# Patient Record
Sex: MALE | Race: WHITE | Employment: OTHER | URBAN - METROPOLITAN AREA
[De-identification: names, ages, dates, MRNs, and addresses within clinical notes are randomized per-mention and may not be internally consistent; named-entity substitution may affect disease eponyms.]

---

## 2018-09-24 ENCOUNTER — OFFICE VISIT (OUTPATIENT)
Dept: ORTHOPEDIC SURGERY | Age: 63
End: 2018-09-24
Payer: COMMERCIAL

## 2018-09-24 VITALS — WEIGHT: 175 LBS | HEIGHT: 69 IN | TEMPERATURE: 98 F | BODY MASS INDEX: 25.92 KG/M2

## 2018-09-24 DIAGNOSIS — M48.061 SPINAL STENOSIS OF LUMBAR REGION WITHOUT NEUROGENIC CLAUDICATION: Primary | ICD-10-CM

## 2018-09-24 PROCEDURE — 99204 OFFICE O/P NEW MOD 45 MIN: CPT | Performed by: ORTHOPAEDIC SURGERY

## 2018-09-24 NOTE — PROGRESS NOTES
(-)swelling, (-) joint pain,swelling. Neurologic: (-) epilepsy, (-)seizures,(-) brain tumor,(-) TIA, (-)stroke, (-)headaches, (-)Parkinson disease,(-) memory loss, (-) LOC. Cardiovascular: (-) Chest pain, (-) swelling in legs/feet, (-) SOB, (-) cramping in legs/feet with walking. Respiratory: (-) SOB, (-) Coughing, (-) night sweats. GI: (-) nausea, (-) vomiting, (-) diarrhea, (-) blood in stool, (-) gastric ulcer. Psychiatric: (-) Depression, (-) Anxiety, (-) bipolar disease, (-) Alzheimer's Disease  Allergic/Immunologic: (-) allergies latex, (-) allergies metal, (-) skin sensitivity. Hematlogic: (-) anemia, (-) blood transfusion, (-) DVT/PE, (-) Clotting disorders      Subjective:      Constitution:    Temp 98 °F (36.7 °C)   Ht 5' 9\" (1.753 m)   Wt 175 lb (79.4 kg)   BMI 25.84 kg/m²       Psycihatric:    The patient is alert and oriented x 3, appears to be stated age and in no distress. Respiratory:    Respiratory effort is not labored. Patient is not gasping. Palpation of the chest reveals no tactile fremitus. Skin:    Upon inspection: the skin appears warm, dry and intact. There is not a previous scar over the affected area. There is not any cellulitis, lymphedema or cutaneous lesions noted in the lower extremities. Upon palpation there is no induration noted. Neurologic:      Motor exam of the lower extremities show ; quadriceps, hamstrings, foot dorsi and plantar flexors intact R.  5/5 and L. 5/5. Deep tendon reflexes are 2/4 at the knees and 2/4 at the ankles with strong extensor hallicus longus motor strength bilaterally. Sensory to both feet is intact to all sensory roots. Cardiovascular: The vascular exam is normal and is well perfused to distal extremities. Distal pulses DP/PT: R. 2+; L. 2+. There is cap refill noted less than two seconds in all digits. There is not edema of the bilateral lower extremities. There is not varicosities noted in the distal extremities.

## 2018-11-05 ENCOUNTER — OFFICE VISIT (OUTPATIENT)
Dept: PHYSICAL MEDICINE AND REHAB | Age: 63
End: 2018-11-05
Payer: COMMERCIAL

## 2018-11-05 VITALS
BODY MASS INDEX: 25.48 KG/M2 | DIASTOLIC BLOOD PRESSURE: 81 MMHG | HEART RATE: 67 BPM | SYSTOLIC BLOOD PRESSURE: 144 MMHG | WEIGHT: 172 LBS | HEIGHT: 69 IN

## 2018-11-05 DIAGNOSIS — M54.17 RADICULOPATHY, LUMBOSACRAL REGION: ICD-10-CM

## 2018-11-05 DIAGNOSIS — M48.062 LUMBAR STENOSIS WITH NEUROGENIC CLAUDICATION: ICD-10-CM

## 2018-11-05 DIAGNOSIS — M51.36 DDD (DEGENERATIVE DISC DISEASE), LUMBAR: Primary | ICD-10-CM

## 2018-11-05 PROBLEM — M51.369 DDD (DEGENERATIVE DISC DISEASE), LUMBAR: Status: ACTIVE | Noted: 2018-11-05

## 2018-11-05 PROCEDURE — 99204 OFFICE O/P NEW MOD 45 MIN: CPT | Performed by: PHYSICAL MEDICINE & REHABILITATION

## 2018-11-21 ENCOUNTER — TELEPHONE (OUTPATIENT)
Dept: PHYSICAL MEDICINE AND REHAB | Age: 63
End: 2018-11-21

## 2018-11-21 NOTE — TELEPHONE ENCOUNTER
Called and spoke with the patient and made him an appointment to go over MRI results. Patient is scheduled on 12-4-18.

## 2018-11-27 ENCOUNTER — EVALUATION (OUTPATIENT)
Dept: PHYSICAL THERAPY | Age: 63
End: 2018-11-27
Payer: COMMERCIAL

## 2018-11-27 DIAGNOSIS — M48.062 LUMBAR STENOSIS WITH NEUROGENIC CLAUDICATION: ICD-10-CM

## 2018-11-27 DIAGNOSIS — M51.36 DDD (DEGENERATIVE DISC DISEASE), LUMBAR: Primary | ICD-10-CM

## 2018-11-27 DIAGNOSIS — M54.17 RADICULOPATHY, LUMBOSACRAL REGION: ICD-10-CM

## 2018-11-27 PROCEDURE — 97163 PT EVAL HIGH COMPLEX 45 MIN: CPT | Performed by: PHYSICAL THERAPIST

## 2018-11-27 NOTE — PROGRESS NOTES
T12-L1 and is normal in caliber and signal. There is decreased AP diameter of the central canal on the basis of short pedicles. There is multilevel intervertebral disc desiccation. T12-L1: No disc herniation. No central canal or foraminal narrowing. L1-L2: No disc herniation. Mild bilateral facet hypertrophy. Mild central canal narrowing. No foraminal narrowing. L2-L3: Diffuse disc bulge. Bilateral facet hypertrophy and ligamentum flavum infolding. Moderate central canal and severe left subarticular recess narrowing. Mild bilateral foraminal narrowing. L3-L4: Diffuse disc bulge. Mild bilateral facet hypertrophy and ligamentum flavum infolding. Mild central canal and severe bilateral subarticular recess narrowing. Mild to moderate bilateral foraminal narrowing. L4-L5: Diffuse disc bulge. Bilateral facet hypertrophy. There is trace fluid within bilateral facet joints with adjacent soft tissue edema on the left. No central canal narrowing. Mild to moderate bilateral subarticular recess and foraminal narrowing. L5-S1: Mild diffuse disc bulge. Mild facet hypertrophy. No central canal narrowing. Mild to moderate bilateral foraminal narrowing. 1.Multilevel lumbar spondylosis superimposed on congenital spinal stenosis, as described above, most pronounced at L2-L3, where there is a disc bulge and facet hypertrophy resulting in moderate central canal and severe left subarticular recess narrowing. 2. Mild central canal and moderate to severe bilateral subarticular recess narrowing at L3-L4. 3. Mild to moderate bilateral foraminal narrowing from L3-L4 to L5-S1. 4. Fluid within the L4-L5 facet joints with adjacent soft tissue edema on the left, suggestive of facet synovitis.       Pain: intermittent   Current: 0/10 sitting    Best: 0/10     Worst:9/10    Symptom Type/Quality: dull, aching  Location[de-identified] Back: L greater trochanter occasionally radiates to L knee     Behavior: condition is getting worse      Provoking percent impaired, limited or restricted    Rehab Potential: [x] Good  [] Fair  [] Poor    PLAN       Treatment Plan:  [x] Therapeutic Exercise IT band stretching, hip ABD AROM  [x] Therapeutic Activity  [x] Neuromuscular Re-education   [] Gait Training  [] Balance Training  [] Aerobic conditioning  [] Manual Therapy  [] Massage/Fascial release   [] Work/Sport specific activities    [] Pain Neuroscience [x] Cold/hotpack  [] Vasocompression  [x] Electrical Stimulation  [] Lumbar/Cervical Traction  [] Ultrasound   [] Iontophoresis: 4 mg/mL Dexamethasone Sodium Phosphate 40-80 mAmin        [x] Instruction in HEP      []  Medication allergies reviewed for use of Dexamethasone Sodium Phosphate 4mg/ml  with iontophoresis treatments. Patient is not allergic. Suggested Professional Referral: [x] No  [] Yes:     Patient Education:  [x] Plans/Goals, Risks/Benefits discussed  [x] Home exercise program  Method of Education: [x] Verbal  [x] Demo  [x] Written  Comprehension of Education:  [x] Verbalizes understanding. [x] Demonstrates understanding. [] Needs Review. [] Demonstrates/verbalizes understanding of HEP/Ed previously given. Frequency:  2-3 times per week for 4-6 weeks    Patient understands diagnosis/prognosis and consents to treatment, plan and goals: [x] Yes    [] No     Thank you for the opportunity to work with your patient. If you have questions or comments, please contact me at [phone]; fax: [phone]. Electronically signed by: Matt Klein PT         Please sign Physician's Certification and return to: [place] [address]  Dept: [phone]  Dept Fax: [phone] Certification / Comments     Frequency/Duration 2-3 / week for 4-6 weeks.    Certification period From: 11-27-18  To: 1-15-19    I have reviewed the Plan of Care established for skilled therapy services and certify that the services are

## 2018-11-30 PROCEDURE — G8979 MOBILITY GOAL STATUS: HCPCS | Performed by: PHYSICAL THERAPIST

## 2018-11-30 PROCEDURE — G8978 MOBILITY CURRENT STATUS: HCPCS | Performed by: PHYSICAL THERAPIST

## 2018-12-04 ENCOUNTER — TREATMENT (OUTPATIENT)
Dept: PHYSICAL THERAPY | Age: 63
End: 2018-12-04

## 2018-12-04 ENCOUNTER — OFFICE VISIT (OUTPATIENT)
Dept: PHYSICAL MEDICINE AND REHAB | Age: 63
End: 2018-12-04
Payer: COMMERCIAL

## 2018-12-04 VITALS
SYSTOLIC BLOOD PRESSURE: 127 MMHG | BODY MASS INDEX: 26.51 KG/M2 | HEART RATE: 80 BPM | HEIGHT: 69 IN | DIASTOLIC BLOOD PRESSURE: 84 MMHG | WEIGHT: 179 LBS

## 2018-12-04 DIAGNOSIS — M48.062 LUMBAR STENOSIS WITH NEUROGENIC CLAUDICATION: ICD-10-CM

## 2018-12-04 DIAGNOSIS — M70.62 GREATER TROCHANTERIC BURSITIS, LEFT: Primary | ICD-10-CM

## 2018-12-04 DIAGNOSIS — M54.17 RADICULOPATHY, LUMBOSACRAL REGION: ICD-10-CM

## 2018-12-04 DIAGNOSIS — M51.36 DDD (DEGENERATIVE DISC DISEASE), LUMBAR: Primary | ICD-10-CM

## 2018-12-04 PROCEDURE — 99024 POSTOP FOLLOW-UP VISIT: CPT | Performed by: PHYSICAL THERAPIST

## 2018-12-04 PROCEDURE — 20610 DRAIN/INJ JOINT/BURSA W/O US: CPT | Performed by: PHYSICAL MEDICINE & REHABILITATION

## 2018-12-04 PROCEDURE — 99214 OFFICE O/P EST MOD 30 MIN: CPT | Performed by: PHYSICAL MEDICINE & REHABILITATION

## 2018-12-04 RX ORDER — TRIAMCINOLONE ACETONIDE 40 MG/ML
40 INJECTION, SUSPENSION INTRA-ARTICULAR; INTRAMUSCULAR ONCE
Status: COMPLETED | OUTPATIENT
Start: 2018-12-04 | End: 2018-12-04

## 2018-12-04 RX ORDER — LIDOCAINE HYDROCHLORIDE 10 MG/ML
4 INJECTION, SOLUTION INFILTRATION; PERINEURAL ONCE
Status: COMPLETED | OUTPATIENT
Start: 2018-12-04 | End: 2018-12-04

## 2018-12-04 RX ADMIN — LIDOCAINE HYDROCHLORIDE 4 ML: 10 INJECTION, SOLUTION INFILTRATION; PERINEURAL at 09:47

## 2018-12-04 RX ADMIN — TRIAMCINOLONE ACETONIDE 40 MG: 40 INJECTION, SUSPENSION INTRA-ARTICULAR; INTRAMUSCULAR at 09:47

## 2018-12-04 NOTE — PROGRESS NOTES
turgor. Psych: Mood is calm. Affect is normal.   Ext: No edema noted     MSK:   Back/Hip Exam:   Inspection: Pelvis was asymmetric. Lumbar lordotic curvature was decreased. There was no scoliosis. No ecchymoses or erythema. glute muscle atrophied bilaterally. Palpation: Palpatory exam revealed no tenderness along lumbosacral paraspinals, midline spine, SI joint sulcus, some ttp left greater trochanter and gluteus medius muscle. There was paraspinal spasms. There were no trigger points. ROM: ROM decreased  Special/provocative testing:   Supine SLR negative    negative FABERS. Neurological Exam:  Strength:   R  L  Hip Flex  5  5  Knee Ext  5  5  Ankle dorsi  5  5  EHL   5- 5-  Ankle Plantar  5  5    Sensory:  Decreased sensation left S1 dermatome     Reflexes:   R  L  Patellar  (2+) (2+)  Ankle Jerk  (tr) (2+)        Gait is Antalgic. MRI L spine   For purposes of this study, the last fully formed disc is considered   L5-S1 and corresponds to axial image 30 of series 6.       There is nonspecific straightening of the lumbar lordosis. Vertebral   bodies maintain normal height. There is trace retrolisthesis of L5 on   S1. There is a small Schmorl's node at the superior endplate of L3   with adjacent marrow edema. There are degenerative endplate changes at   V6-P7. No acute fracture is identified.       Conus medullaris terminates at T12-L1 and is normal in caliber and   signal. There is decreased AP diameter of the central canal on the   basis of short pedicles.       There is multilevel intervertebral disc desiccation.       T12-L1: No disc herniation. No central canal or foraminal narrowing.       L1-L2: No disc herniation. Mild bilateral facet hypertrophy. Mild   central canal narrowing. No foraminal narrowing.       L2-L3: Diffuse disc bulge. Bilateral facet hypertrophy and ligamentum   flavum infolding. Moderate central canal and severe left subarticular   recess narrowing.  Mild bilateral foraminal

## 2018-12-10 ENCOUNTER — OFFICE VISIT (OUTPATIENT)
Dept: PHYSICAL MEDICINE AND REHAB | Age: 63
End: 2018-12-10
Payer: COMMERCIAL

## 2018-12-10 VITALS — WEIGHT: 178 LBS | BODY MASS INDEX: 26.36 KG/M2 | HEIGHT: 69 IN

## 2018-12-10 DIAGNOSIS — M70.62 GREATER TROCHANTERIC BURSITIS, LEFT: ICD-10-CM

## 2018-12-10 DIAGNOSIS — M54.17 RADICULOPATHY, LUMBOSACRAL REGION: Primary | ICD-10-CM

## 2018-12-10 PROCEDURE — 95886 MUSC TEST DONE W/N TEST COMP: CPT | Performed by: PHYSICAL MEDICINE & REHABILITATION

## 2018-12-10 PROCEDURE — 95909 NRV CNDJ TST 5-6 STUDIES: CPT | Performed by: PHYSICAL MEDICINE & REHABILITATION

## 2018-12-10 NOTE — PROGRESS NOTES
All MUAP's were of normal amplitude and duration with a full recruitment pattern. No increase in polyphasic potentials was noted. Diagnostic Interpretation: This study was abnormal.     1. 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 It is acute on chronic in duration, moderate to severe in severity. Prognosis for axonal lesions is poor and dependent on collateral sprouting. Previous Study: N/A      Follow up EMG is recommended if clinically warranted. Technologist: Kelsi Griffin LPN, [facility]88, DO, [address] [place] [address]   Board Certified Physical Medicine and Rehabilitation      Nerve conduction studies and electromyography were performed according to our laboratory policies and procedures which can be provided upon request. All abnormal values are identified in the table.  Laboratory normal values can also be provided upon request.       Cc: Isabel Soni MD

## 2018-12-10 NOTE — PATIENT INSTRUCTIONS
Electrodiagnotic Laboratory  Accredited by the [facility] with Exemplary status  [place] Lindsay D.O. [facility]  [address] [place]  Phone: [phone]  Fax: [phone]        Today you had an electrodiagnostic exam which included nerve conduction studies (NCS) and electromyography (EMG). This test evaluated the electrical activity of your nerves and muscles to help determine if you have a nerve or muscle disease. This test can help determine the location and type of a nerve or muscle problem. This will help your referring doctor diagnose your condition and determine the appropriate next step in your treatment plan. After your test:    1. There are no long lasting side effects of the test.     2. You may resume your normal activities without restrictions. 3.  Resume any medications that were stopped for the test.     4  If you have sore areas or bruising in your muscles where the needle was placed, apply a cold pack to the sore area for 15-20 minutes three to four times a day as needed for pain. The soreness should go away in about 1-2 days. 5. Your results were provided  Briefly at the end of your test and the final detailed report will be provided to your referring physician, and/or primary care physician and any other parties you requested within 1-2 days of the examination. You may wish to contact your referring provider after a few days to determine what they would like you to do next. 6.  Please call [phone] with any questions or concerns and if you develop increased body temperature/fever, swelling, tenderness, increased pain and/or drainage from the sites where the needle was placed. Thank you for choosing us for your health care needs.

## 2018-12-11 ENCOUNTER — TREATMENT (OUTPATIENT)
Dept: PHYSICAL THERAPY | Age: 63
End: 2018-12-11
Payer: COMMERCIAL

## 2018-12-11 DIAGNOSIS — M51.36 DDD (DEGENERATIVE DISC DISEASE), LUMBAR: Primary | ICD-10-CM

## 2018-12-11 DIAGNOSIS — M48.062 LUMBAR STENOSIS WITH NEUROGENIC CLAUDICATION: ICD-10-CM

## 2018-12-11 DIAGNOSIS — M54.17 RADICULOPATHY, LUMBOSACRAL REGION: ICD-10-CM

## 2018-12-11 PROCEDURE — 97110 THERAPEUTIC EXERCISES: CPT | Performed by: PHYSICAL THERAPIST

## 2018-12-11 NOTE — PROGRESS NOTES
Physical Therapy Daily Treatment Note    Date: 2018  Patient Name: Guillaume Alatorre  : 1955   MRN: [id number]  DOInjury:   Referring Provider: Ronaldo Martinez DO  [address] [place] [address], [address] [facility]     Medical Diagnosis:      Diagnosis Orders   1. DDD (degenerative disc disease), lumbar     2. Lumbar stenosis with neurogenic claudication     3. Radiculopathy, lumbosacral region         Outcome Measure:    PT G-Codes  Functional Limitation: Mobility: Walking and moving around  Mobility: Walking and Moving Around Current Status (): At least 20 percent but less than 40 percent impaired, limited or restricted  Mobility: Walking and Moving Around Goal Status (): At least 1 percent but less than 20 percent impaired, limited or restricted    S: Reports having EMG done yesterday; indicates S1 motor radiculopathy. O:   Time 9300-1749     Visit 2/     Pain 3/10     ROM      Modalities         MO   Exercise         TE   S-lying hip ABD 3 x 15  TE   Clamshells 3 x 15  TE   Bridging 3 x 15  TE   Nerve flossing  2 x 10 león        TE   Hip hiking   TE   One-leg deadlift ? TE      TE      TE   Step-ups - FWD   TE   Step-ups - LAT   TE   Step-ups - BWD   TE   Calf Raises   TE      TE      TE               A:  Tolerated well. Above added to written HEP. Discussed do's, don'ts, and best practices.    P: Continue with rehab plan  Dianelys Mcfadden, PT    Treatment Charges: Mins Units   Initial Evaluation     Re-Evaluation     Ther Exercise         TE 45 3   Manual Therapy     MT     Ther Activities        TA     Gait Training          GT     Neuro Re-education NR     Modalities     Non-Billable Service Time     Other     Total Time/Units 45 3

## 2018-12-17 ENCOUNTER — TREATMENT (OUTPATIENT)
Dept: PHYSICAL THERAPY | Age: 63
End: 2018-12-17
Payer: COMMERCIAL

## 2018-12-17 DIAGNOSIS — M54.17 RADICULOPATHY, LUMBOSACRAL REGION: ICD-10-CM

## 2018-12-17 DIAGNOSIS — M51.36 DDD (DEGENERATIVE DISC DISEASE), LUMBAR: Primary | ICD-10-CM

## 2018-12-17 DIAGNOSIS — M48.062 LUMBAR STENOSIS WITH NEUROGENIC CLAUDICATION: ICD-10-CM

## 2018-12-17 PROCEDURE — 97110 THERAPEUTIC EXERCISES: CPT | Performed by: PHYSICAL THERAPIST

## 2019-01-02 ENCOUNTER — TREATMENT (OUTPATIENT)
Dept: PHYSICAL THERAPY | Age: 64
End: 2019-01-02
Payer: COMMERCIAL

## 2019-01-02 DIAGNOSIS — M51.36 DDD (DEGENERATIVE DISC DISEASE), LUMBAR: Primary | ICD-10-CM

## 2019-01-02 DIAGNOSIS — M54.17 RADICULOPATHY, LUMBOSACRAL REGION: ICD-10-CM

## 2019-01-02 DIAGNOSIS — M48.062 LUMBAR STENOSIS WITH NEUROGENIC CLAUDICATION: ICD-10-CM

## 2019-01-02 PROCEDURE — 97110 THERAPEUTIC EXERCISES: CPT | Performed by: PHYSICAL THERAPIST

## 2019-01-10 ENCOUNTER — TELEPHONE (OUTPATIENT)
Dept: PHYSICAL MEDICINE AND REHAB | Age: 64
End: 2019-01-10

## 2019-04-23 ENCOUNTER — OFFICE VISIT (OUTPATIENT)
Dept: PHYSICAL MEDICINE AND REHAB | Age: 64
End: 2019-04-23
Payer: COMMERCIAL

## 2019-04-23 ENCOUNTER — PREP FOR PROCEDURE (OUTPATIENT)
Dept: PHYSICAL MEDICINE AND REHAB | Age: 64
End: 2019-04-23

## 2019-04-23 VITALS
HEIGHT: 69 IN | DIASTOLIC BLOOD PRESSURE: 73 MMHG | HEART RATE: 92 BPM | BODY MASS INDEX: 25.92 KG/M2 | SYSTOLIC BLOOD PRESSURE: 123 MMHG | WEIGHT: 175 LBS

## 2019-04-23 DIAGNOSIS — M54.17 RADICULOPATHY, LUMBOSACRAL REGION: ICD-10-CM

## 2019-04-23 DIAGNOSIS — M70.62 GREATER TROCHANTERIC BURSITIS, LEFT: ICD-10-CM

## 2019-04-23 DIAGNOSIS — M48.062 SPINAL STENOSIS OF LUMBAR REGION WITH NEUROGENIC CLAUDICATION: Primary | ICD-10-CM

## 2019-04-23 PROCEDURE — 99214 OFFICE O/P EST MOD 30 MIN: CPT | Performed by: PHYSICAL MEDICINE & REHABILITATION

## 2019-04-23 RX ORDER — GABAPENTIN 300 MG/1
300 CAPSULE ORAL 3 TIMES DAILY
Qty: 90 CAPSULE | Refills: 2 | Status: SHIPPED | OUTPATIENT
Start: 2019-04-23 | End: 2019-08-13

## 2019-04-23 NOTE — PATIENT INSTRUCTIONS
We appreciate that you chose [facility] to provide your healthcare needs today. We took great pleasure in caring for you. You may receive a survey to help us learn how to make your next visit to a [facility] facility better than the last.   Your feedback is important to help us continually improve the service we can provide you. Please take the time to complete it thoughtfully if you receive one as we value the feedback in every survey. In this survey we would appreciate that you answer \"always\" or \"yes\" for as many questions as possible (this is the answer we get credit for doing a good job). If you feel there is a question you cannot answer \"always\" or \"yes\", please let us know before you leave today so we can remedy the situation right away. We look forward to continuing to provide you with excellent care. Considering the survey questions related to access to care, please keep in mind the urgency of your problem (i.e. was it an emergent or urgent visit or more routine)  and whether the urgency of that problem was handled appropriately by our office. We always do our best to prioritize the patients who need the care most urgently given our specialty is in short supply and there is a high demand for visits. Thank you for your time and consideration. Dr. Eloy Byrne  Neurontin     AM        PM       BEDTIME    Day 0-3       -            -             300mg  Day 4-6       300       -              300mg  Day 7+        300       300         300mg    If you note any increase in depression symptoms after starting the Neurontin, please call the office. If you experience a side effect that you cannot tolerate, please do not stop Neurontin suddenly as this can result in a seizure. Call office at [phone] if you wish to stop taking it and we will give you instructions how to go off the medication.

## 2019-04-23 NOTE — PROGRESS NOTES
Maricarmen Berman, [address] [facility]  [address] [facility] [place]  Phone: [phone]  Fax: [phone]    PCP: Belva Severs, MD  Date of visit: 4/23/19    Chief Complaint   Patient presents with    Hip Pain     follow up       Interval:   Patient presents today for continued pain. Since last seen, he completed PT. He complains of continued pain mostly in bilateral buttocks with radiation into the left leg. Worse with standing and walking. He will need to sit down and the pain improves. The pain is rated Pain Score:   5. He states his right shin feels cold and wet. Pain is described as vice  and cramping. The pain is better with sitting. The pain is worse with standing, walking. There is associated numbness/tingling in legs. There is no weakness. There is no bowel/bladder changes. The prior workup has included: Xray hip, MRI lumbar spine    The prior treatment has included:  PT: completed   Modalities: none   OTC Tylenol: none    NSAIDS: aleve, naprosyn with some relief   Opioids: none   Membrane stabilizers: none    Muscle relaxers: none   Previous injections: none   Previous surgery at this site: none      Allergies   Allergen Reactions    Penicillins        Current Outpatient Medications   Medication Sig Dispense Refill    gabapentin (NEURONTIN) 300 MG capsule Take 1 capsule by mouth 3 times daily for 30 days. 90 capsule 2    aspirin 81 MG tablet Take 81 mg by mouth daily      Multiple Vitamins-Minerals (CENTRUM SILVER ADULT 50+ PO) Take by mouth      Vitamins-Lipotropics (LIPO-FLAVONOID PLUS PO) two with each meal      diclofenac (VOLTAREN) 50 MG EC tablet Take 1 tablet by mouth 2 times daily as needed for Pain 60 tablet 2     No current facility-administered medications for this visit.         Past Medical History:   Diagnosis Date    DDD (degenerative disc disease), lumbar 11/5/2018    Meniere disease     Prostate cancer ([facility] 09/2018       Past Surgical History:   Procedure Laterality Date    TONSILLECTOMY      childhood       Family History   Problem Relation Age of Onset    Prostate Cancer Brother      Social History     Tobacco Use    Smoking status: Never Smoker    Smokeless tobacco: Never Used   Substance Use Topics    Alcohol use: No    Drug use: No       Functional Status: The patient is able to ambulate and perform activities of daily living without the use of an assistive device. Occupation: The patient is currently employed. ROS:    Constitutional: Denies fevers, chills, night sweats, unintentional weight loss     Skin: Denies rash or skin changes     Eyes: Denies vision changes    Ears/Nose/Throat: Denies nasal congestion or sore throat     Respiratory: Denies SOB or cough     Cardiovascular: Denies CP, palpitations, edema      Gastrointestinal: Denies abdominal pain,  N/V, constipation, or diarrhea    Genitourinary: Denies urinary symptoms    Neurologic: See HPI.     MSK: See HPI. Psychiatric: Denies sleep disturbance, anxiety, depression    Hematologic/Lymphatic/Immunologic: Denies bruising       Physical Exam:   Blood pressure 123/73, pulse 92, height 5' 9\" (1.753 m), weight 175 lb (79.4 kg). General: well developed and well nourished in no acute distress. Body habitus is normal.   HEENT: No rhinorrhea, sneezing, yawning, or lacrimation. No scleral icterus or conjunctival injection. Resp: symmetrical chest expansion, unlabored breathing, respirations unlabored. CV: Heart rate is regular. Peripheral pulses are palpable  Lymph: No visible regional lymphadenopathy. Skin: No rashes or ecchymosis. Normal turgor. Psych: Mood is calm. Affect is normal.   Ext: No edema noted     MSK:   Back/Hip Exam:   Inspection: Pelvis was asymmetric. Lumbar lordotic curvature was decreased. There was no scoliosis. No ecchymoses or erythema. glute muscle atrophied bilaterally.    Palpation: Palpatory exam revealed no tenderness along lumbosacral paraspinals, midline spine, SI joint sulcus, some ttp left greater trochanter and gluteus medius muscle. There was paraspinal spasms. There were no trigger points. ROM: ROM decreased  Special/provocative testing:   Supine SLR negative    negative FABERS. Neurological Exam:  Strength:   R  L  Hip Flex  5  5  Knee Ext  5  5  Ankle dorsi  5  5  EHL   5- 5-  Ankle Plantar  5  5    Sensory:  Decreased sensation left S1 dermatome     Reflexes:   R  L  Patellar  (2+) (2+)  Ankle Jerk  (tr) (2+)        Gait is Antalgic. MRI L spine   For purposes of this study, the last fully formed disc is considered   L5-S1 and corresponds to axial image 30 of series 6.       There is nonspecific straightening of the lumbar lordosis. Vertebral   bodies maintain normal height. There is trace retrolisthesis of L5 on   S1. There is a small Schmorl's node at the superior endplate of L3   with adjacent marrow edema. There are degenerative endplate changes at   P3-U5. No acute fracture is identified.       Conus medullaris terminates at T12-L1 and is normal in caliber and   signal. There is decreased AP diameter of the central canal on the   basis of short pedicles.       There is multilevel intervertebral disc desiccation.       T12-L1: No disc herniation. No central canal or foraminal narrowing.       L1-L2: No disc herniation. Mild bilateral facet hypertrophy. Mild   central canal narrowing. No foraminal narrowing.       L2-L3: Diffuse disc bulge. Bilateral facet hypertrophy and ligamentum   flavum infolding. Moderate central canal and severe left subarticular   recess narrowing. Mild bilateral foraminal narrowing.       L3-L4: Diffuse disc bulge. Mild bilateral facet hypertrophy and   ligamentum flavum infolding. Mild central canal and severe bilateral   subarticular recess narrowing. Mild to moderate bilateral foraminal   narrowing.       L4-L5: Diffuse disc bulge.  Bilateral facet hypertrophy. There is trace   fluid within bilateral facet joints with adjacent soft tissue edema on   the left. No central canal narrowing. Mild to moderate bilateral   subarticular recess and foraminal narrowing.       L5-S1: Mild diffuse disc bulge. Mild facet hypertrophy. No central   canal narrowing. Mild to moderate bilateral foraminal narrowing.           Impression   1. 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 2. Mild central canal and moderate to severe bilateral subarticular   recess narrowing at L3-L4. 3. Mild to moderate bilateral foraminal narrowing from L3-L4 to L5-S1.       4. Fluid within the L4-L5 facet joints with adjacent soft tissue edema   on the left, suggestive of facet synovitis. Impression:   Freeman Lipscomb is a 62 y.o. male     1. Spinal stenosis of lumbar region with neurogenic claudication    2. Greater trochanteric bursitis, left    3. Radiculopathy, lumbosacral region        Plan:   · Patient would benefit from L4-5 ILESI. Procedure risks, benefits and alternatives were discussed. Patient would like to proceed. · Will start neurontin 300mg TID as directed. · MRI reviewed again. · Continue HEP       The patient was educated about the diagnosis, prognosis, indications, risks and benefits of treatment. An opportunity to ask questions was given to the patient and questions were answered. The patient agreed to proceed with the recommended treatment as described above.      Follow up after injection     Adan Schafer DO, [facility]   Board Certified Physical Medicine and Rehabilitation

## 2019-04-23 NOTE — H&P
[employer], [id number] [facility] Physical Medicine and Rehabilitation  [address] [facility] [place]  Phone: [phone]  Fax: [phone]    PCP: Harman Sam MD  Date of visit: 4/23/19    Chief Complaint   Patient presents with    Hip Pain     follow up       Interval:   Patient presents today for continued pain. Since last seen, he completed PT. He complains of continued pain mostly in bilateral buttocks with radiation into the left leg. Worse with standing and walking. He will need to sit down and the pain improves. The pain is rated Pain Score:   5. He states his right shin feels cold and wet. Pain is described as vice  and cramping. The pain is better with sitting. The pain is worse with standing, walking. There is associated numbness/tingling in legs. There is no weakness. There is no bowel/bladder changes. The prior workup has included: Xray hip, MRI lumbar spine    The prior treatment has included:  PT: completed   Modalities: none   OTC Tylenol: none    NSAIDS: aleve, naprosyn with some relief   Opioids: none   Membrane stabilizers: none    Muscle relaxers: none   Previous injections: none   Previous surgery at this site: none      Allergies   Allergen Reactions    Penicillins        Current Outpatient Medications   Medication Sig Dispense Refill    gabapentin (NEURONTIN) 300 MG capsule Take 1 capsule by mouth 3 times daily for 30 days. 90 capsule 2    aspirin 81 MG tablet Take 81 mg by mouth daily      Multiple Vitamins-Minerals (CENTRUM SILVER ADULT 50+ PO) Take by mouth      Vitamins-Lipotropics (LIPO-FLAVONOID PLUS PO) two with each meal      diclofenac (VOLTAREN) 50 MG EC tablet Take 1 tablet by mouth 2 times daily as needed for Pain 60 tablet 2     No current facility-administered medications for this visit.         Past Medical History:   Diagnosis Date    DDD (degenerative disc disease), lumbar 11/5/2018    Meniere disease     Prostate cancer ([facility] 09/2018       Past Surgical History:   Procedure Laterality Date    TONSILLECTOMY      childhood       Family History   Problem Relation Age of Onset    Prostate Cancer Brother      Social History     Tobacco Use    Smoking status: Never Smoker    Smokeless tobacco: Never Used   Substance Use Topics    Alcohol use: No    Drug use: No       Functional Status: The patient is able to ambulate and perform activities of daily living without the use of an assistive device. Occupation: The patient is currently employed. ROS:    Constitutional: Denies fevers, chills, night sweats, unintentional weight loss     Skin: Denies rash or skin changes     Eyes: Denies vision changes    Ears/Nose/Throat: Denies nasal congestion or sore throat     Respiratory: Denies SOB or cough     Cardiovascular: Denies CP, palpitations, edema      Gastrointestinal: Denies abdominal pain,  N/V, constipation, or diarrhea    Genitourinary: Denies urinary symptoms    Neurologic: See HPI.     MSK: See HPI. Psychiatric: Denies sleep disturbance, anxiety, depression    Hematologic/Lymphatic/Immunologic: Denies bruising       Physical Exam:   Blood pressure 123/73, pulse 92, height 5' 9\" (1.753 m), weight 175 lb (79.4 kg). General: well developed and well nourished in no acute distress. Body habitus is normal.   HEENT: No rhinorrhea, sneezing, yawning, or lacrimation. No scleral icterus or conjunctival injection. Resp: symmetrical chest expansion, unlabored breathing, respirations unlabored. CV: Heart rate is regular. Peripheral pulses are palpable  Lymph: No visible regional lymphadenopathy. Skin: No rashes or ecchymosis. Normal turgor. Psych: Mood is calm. Affect is normal.   Ext: No edema noted     MSK:   Back/Hip Exam:   Inspection: Pelvis was asymmetric. Lumbar lordotic curvature was decreased. There was no scoliosis. No ecchymoses or erythema. glute muscle atrophied bilaterally.    Palpation: Palpatory exam revealed no tenderness along lumbosacral paraspinals, midline spine, SI joint sulcus, some ttp left greater trochanter and gluteus medius muscle. There was paraspinal spasms. There were no trigger points. ROM: ROM decreased  Special/provocative testing:   Supine SLR negative    negative FABERS. Neurological Exam:  Strength:   R  L  Hip Flex  5  5  Knee Ext  5  5  Ankle dorsi  5  5  EHL   5- 5-  Ankle Plantar  5  5    Sensory:  Decreased sensation left S1 dermatome     Reflexes:   R  L  Patellar  (2+) (2+)  Ankle Jerk  (tr) (2+)        Gait is Antalgic. MRI L spine   For purposes of this study, the last fully formed disc is considered   L5-S1 and corresponds to axial image 30 of series 6.       There is nonspecific straightening of the lumbar lordosis. Vertebral   bodies maintain normal height. There is trace retrolisthesis of L5 on   S1. There is a small Schmorl's node at the superior endplate of L3   with adjacent marrow edema. There are degenerative endplate changes at   N5-F2. No acute fracture is identified.       Conus medullaris terminates at T12-L1 and is normal in caliber and   signal. There is decreased AP diameter of the central canal on the   basis of short pedicles.       There is multilevel intervertebral disc desiccation.       T12-L1: No disc herniation. No central canal or foraminal narrowing.       L1-L2: No disc herniation. Mild bilateral facet hypertrophy. Mild   central canal narrowing. No foraminal narrowing.       L2-L3: Diffuse disc bulge. Bilateral facet hypertrophy and ligamentum   flavum infolding. Moderate central canal and severe left subarticular   recess narrowing. Mild bilateral foraminal narrowing.       L3-L4: Diffuse disc bulge. Mild bilateral facet hypertrophy and   ligamentum flavum infolding. Mild central canal and severe bilateral   subarticular recess narrowing. Mild to moderate bilateral foraminal   narrowing.       L4-L5: Diffuse disc bulge.  Bilateral facet

## 2019-05-02 ENCOUNTER — HOSPITAL ENCOUNTER (OUTPATIENT)
Dept: OPERATING ROOM | Age: 64
Setting detail: OUTPATIENT SURGERY
Discharge: HOME | End: 2019-05-02
Attending: PHYSICAL MEDICINE & REHABILITATION
Payer: COMMERCIAL

## 2019-05-02 ENCOUNTER — HOSPITAL ENCOUNTER (OUTPATIENT)
Age: 64
Setting detail: OUTPATIENT SURGERY
Discharge: HOME | End: 2019-05-02
Attending: PHYSICAL MEDICINE & REHABILITATION | Admitting: PHYSICAL MEDICINE & REHABILITATION
Payer: COMMERCIAL

## 2019-05-02 VITALS
RESPIRATION RATE: 14 BRPM | HEART RATE: 66 BPM | OXYGEN SATURATION: 97 % | DIASTOLIC BLOOD PRESSURE: 78 MMHG | TEMPERATURE: 98 F | SYSTOLIC BLOOD PRESSURE: 117 MMHG

## 2019-05-02 DIAGNOSIS — M54.16 LUMBAR RADICULOPATHY: ICD-10-CM

## 2019-05-02 PROCEDURE — 2500000003 HC RX 250 WO HCPCS: Performed by: PHYSICAL MEDICINE & REHABILITATION

## 2019-05-02 PROCEDURE — 7100000011 HC PHASE II RECOVERY - ADDTL 15 MIN: Performed by: PHYSICAL MEDICINE & REHABILITATION

## 2019-05-02 PROCEDURE — 6360000002 HC RX W HCPCS: Performed by: PHYSICAL MEDICINE & REHABILITATION

## 2019-05-02 PROCEDURE — 2580000003 HC RX 258: Performed by: PHYSICAL MEDICINE & REHABILITATION

## 2019-05-02 PROCEDURE — 3600000005 HC SURGERY LEVEL 5 BASE: Performed by: PHYSICAL MEDICINE & REHABILITATION

## 2019-05-02 PROCEDURE — 2709999900 HC NON-CHARGEABLE SUPPLY: Performed by: PHYSICAL MEDICINE & REHABILITATION

## 2019-05-02 PROCEDURE — 7100000010 HC PHASE II RECOVERY - FIRST 15 MIN: Performed by: PHYSICAL MEDICINE & REHABILITATION

## 2019-05-02 PROCEDURE — 3209999900 FLUORO FOR SURGICAL PROCEDURES

## 2019-05-02 PROCEDURE — 62323 NJX INTERLAMINAR LMBR/SAC: CPT | Performed by: PHYSICAL MEDICINE & REHABILITATION

## 2019-05-02 PROCEDURE — 6360000004 HC RX CONTRAST MEDICATION: Performed by: PHYSICAL MEDICINE & REHABILITATION

## 2019-05-02 RX ORDER — LIDOCAINE HYDROCHLORIDE 10 MG/ML
INJECTION, SOLUTION EPIDURAL; INFILTRATION; INTRACAUDAL; PERINEURAL PRN
Status: DISCONTINUED | OUTPATIENT
Start: 2019-05-02 | End: 2019-05-02 | Stop reason: ALTCHOICE

## 2019-05-02 ASSESSMENT — PAIN - FUNCTIONAL ASSESSMENT: PAIN_FUNCTIONAL_ASSESSMENT: 0-10

## 2019-05-02 ASSESSMENT — PAIN SCALES - GENERAL
PAINLEVEL_OUTOF10: 0
PAINLEVEL_OUTOF10: 0

## 2019-05-02 ASSESSMENT — PAIN DESCRIPTION - DESCRIPTORS: DESCRIPTORS: ACHING;DISCOMFORT

## 2019-05-02 NOTE — H&P
Jono Zacarias, [address] [facility] Physical Medicine and Rehabilitation  [address] [facility] [place]  Phone: [phone]  Fax: [phone]     PCP: Emily David MD  Date of visit: 4/23/19          Chief Complaint   Patient presents with    Hip Pain       follow up         Interval:   Patient presents today for continued pain. Since last seen, he completed PT. He complains of continued pain mostly in bilateral buttocks with radiation into the left leg. Worse with standing and walking. He will need to sit down and the pain improves. The pain is rated Pain Score:   5. He states his right shin feels cold and wet. Pain is described as vice  and cramping. The pain is better with sitting. The pain is worse with standing, walking. There is associated numbness/tingling in legs. There is no weakness. There is no bowel/bladder changes.      The prior workup has included: Xray hip, MRI lumbar spine     The prior treatment has included:  PT: completed   Modalities: none   OTC Tylenol: none    NSAIDS: aleve, naprosyn with some relief   Opioids: none   Membrane stabilizers: none    Muscle relaxers: none   Previous injections: none   Previous surgery at this site: none             Allergies   Allergen Reactions    Penicillins                  Current Outpatient Medications   Medication Sig Dispense Refill    gabapentin (NEURONTIN) 300 MG capsule Take 1 capsule by mouth 3 times daily for 30 days.  90 capsule 2    aspirin 81 MG tablet Take 81 mg by mouth daily        Multiple Vitamins-Minerals (CENTRUM SILVER ADULT 50+ PO) Take by mouth        Vitamins-Lipotropics (LIPO-FLAVONOID PLUS PO) two with each meal        diclofenac (VOLTAREN) 50 MG EC tablet Take 1 tablet by mouth 2 times daily as needed for Pain 60 tablet 2      No current facility-administered medications for this visit.               Past Medical History:   Diagnosis Date    DDD (degenerative disc disease), lumbar 11/5/2018    Meniere disease      Prostate cancer ([facility] [address] 09/2018               Past Surgical History:   Procedure Laterality Date    TONSILLECTOMY         childhood               Family History   Problem Relation Age of Onset    Prostate Cancer Brother        Social History           Tobacco Use    Smoking status: Never Smoker    Smokeless tobacco: Never Used   Substance Use Topics    Alcohol use: No    Drug use: No         Functional Status: The patient is able to ambulate and perform activities of daily living without the use of an assistive device.       Occupation: The patient is currently employed. ROS:    Constitutional: Denies fevers, chills, night sweats, unintentional weight loss     Skin: Denies rash or skin changes     Eyes: Denies vision changes    Ears/Nose/Throat: Denies nasal congestion or sore throat     Respiratory: Denies SOB or cough     Cardiovascular: Denies CP, palpitations, edema      Gastrointestinal: Denies abdominal pain,  N/V, constipation, or diarrhea    Genitourinary: Denies urinary symptoms    Neurologic: See HPI.     MSK: See HPI. Psychiatric: Denies sleep disturbance, anxiety, depression    Hematologic/Lymphatic/Immunologic: Denies bruising         Physical Exam:   Blood pressure 123/73, pulse 92, height 5' 9\" (1.753 m), weight 175 lb (79.4 kg). General: well developed and well nourished in no acute distress. Body habitus is normal.   HEENT: No rhinorrhea, sneezing, yawning, or lacrimation. No scleral icterus or conjunctival injection. Resp: symmetrical chest expansion, unlabored breathing, respirations unlabored. CV: Heart rate is regular. Peripheral pulses are palpable  Lymph: No visible regional lymphadenopathy. Skin: No rashes or ecchymosis. Normal turgor. Psych: Mood is calm. Affect is normal.   Ext: No edema noted      MSK:   Back/Hip Exam:   Inspection: Pelvis was asymmetric. Lumbar lordotic curvature was decreased. There was no scoliosis.   No ecchymoses or erythema. glute muscle atrophied bilaterally. Palpation: Palpatory exam revealed no tenderness along lumbosacral paraspinals, midline spine, SI joint sulcus, some ttp left greater trochanter and gluteus medius muscle. There was paraspinal spasms. There were no trigger points. ROM: ROM decreased  Special/provocative testing:   Supine SLR negative    negative FABERS.      Neurological Exam:  Strength:                     R          L  Hip Flex                       5          5  Knee Ext                     5          5  Ankle dorsi                  5          5  EHL                             5-         5-  Ankle Plantar               5          5     Sensory:  Decreased sensation left S1 dermatome      Reflexes:                     R          L  Patellar                        (2+)      (2+)  Ankle Jerk                   (tr)        (2+)           Gait is Antalgic.      MRI L spine   For purposes of this study, the last fully formed disc is considered   L5-S1 and corresponds to axial image 30 of series 6.       There is nonspecific straightening of the lumbar lordosis. Vertebral   bodies maintain normal height. There is trace retrolisthesis of L5 on   S1. There is a small Schmorl's node at the superior endplate of L3   with adjacent marrow edema. There are degenerative endplate changes at   H1-F0. No acute fracture is identified.       Conus medullaris terminates at T12-L1 and is normal in caliber and   signal. There is decreased AP diameter of the central canal on the   basis of short pedicles.       There is multilevel intervertebral disc desiccation.       T12-L1: No disc herniation. No central canal or foraminal narrowing.       L1-L2: No disc herniation. Mild bilateral facet hypertrophy. Mild   central canal narrowing. No foraminal narrowing.       L2-L3: Diffuse disc bulge. Bilateral facet hypertrophy and ligamentum   flavum infolding.  Moderate central canal and severe left subarticular recess narrowing. Mild bilateral foraminal narrowing.       L3-L4: Diffuse disc bulge. Mild bilateral facet hypertrophy and   ligamentum flavum infolding. Mild central canal and severe bilateral   subarticular recess narrowing. Mild to moderate bilateral foraminal   narrowing.       L4-L5: Diffuse disc bulge. Bilateral facet hypertrophy. There is trace   fluid within bilateral facet joints with adjacent soft tissue edema on   the left. No central canal narrowing. Mild to moderate bilateral   subarticular recess and foraminal narrowing.       L5-S1: Mild diffuse disc bulge. Mild facet hypertrophy. No central   canal narrowing. Mild to moderate bilateral foraminal narrowing.           Impression   1. 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 2. Mild central canal and moderate to severe bilateral subarticular   recess narrowing at L3-L4. 3. Mild to moderate bilateral foraminal narrowing from L3-L4 to L5-S1.       4. Fluid within the L4-L5 facet joints with adjacent soft tissue edema   on the left, suggestive of facet synovitis.          Impression:   Gregg Carrel is a 62 y.o. male      1. Spinal stenosis of lumbar region with neurogenic claudication    2. Greater trochanteric bursitis, left    3. Radiculopathy, lumbosacral region          Plan:   · Patient would benefit from L4-5 ILESI. Procedure risks, benefits and alternatives were discussed. Patient would like to proceed. · Will start neurontin 300mg TID as directed. · MRI reviewed again. · Continue HEP         · The patient was educated about the diagnosis, prognosis, indications, risks and benefits of treatment. An opportunity to ask questions was given to the patient and questions were answered.   The patient agreed to proceed with the recommended treatment as described above.      Follow up after injection      Tasha Pandya DO, FAAPMR   Board Certified Physical Medicine and Rehabilitation

## 2019-05-02 NOTE — OP NOTE
EPIDURAL STEROID INJECTION, INTERLAMINAR APPROACH      WITH FLUOROSCOPIC GUIDANCE      Patient: Chaitanya Monique              MRN#: [id number]  : 1955            Date of procedure: 2019      Physician Performing Procedure:  Vijay Hill DO    Clinical Scenario: As per electronic documentation. Diagnosis: lumbar stenosis     Injectate: A total of 5 cc; consisting of 1cc of Dexamethasone (10mg/cc), with the remainder of normal saline. Levels Treated: L4-5    Approach:  Interlaminar      Comments:  None     Pre-procedural evaluation: The patient was examined today just prior to performing the procedure listed above. The patient's heart rate was normal and lungs were clear to auscultation bilaterally. Procedure: The patient was prepped and draped in a sterile fashion in the prone position after informed consent was signed and all the patient's questions were answered including the risks, benefits, alternative treatment options, and prognosis. The risks include - but are not limited to - infection, allergic reaction, increased pain, lack of therapeutic benefit, steroid reaction, nerve damage, paralysis, stroke, epidural hematoma, syncope, headache, respiratory or cardiac arrest, pneumothorax, and scar formation. Using a (paramedian approach from the side mentioned above/midline approach), the region overlying the inferior lamina was localized under fluoroscopic visualization and the soft tissues overlying this structure were infiltrated with 4 cc. of 1% buffered Lidocaine without Epinephrine. A 22 gauge, 3.5 inch Tuohy needle was inserted into the epidural space using the approach mentioned above. The epidural space was localized using loss of resistance after negative aspirate for air, blood, and CSF. A 2 cc. volume of Isoview was injected into the epidural space and the flow of contrast was observed to be epidural.  Radiographs were obtained for documentation purposes.       The

## 2019-05-03 ENCOUNTER — TELEPHONE (OUTPATIENT)
Dept: PHYSICAL MEDICINE AND REHAB | Age: 64
End: 2019-05-03

## 2019-05-03 NOTE — TELEPHONE ENCOUNTER
Patient had epidural injections done on 5-2-19 and has a follow up appointment on 5-29-19. Patient would like to know if they should continue PT in the meantime or wait until after their next appointment. Please advise.

## 2019-05-28 ENCOUNTER — OFFICE VISIT (OUTPATIENT)
Dept: PHYSICAL MEDICINE AND REHAB | Age: 64
End: 2019-05-28
Payer: COMMERCIAL

## 2019-05-28 VITALS
DIASTOLIC BLOOD PRESSURE: 76 MMHG | BODY MASS INDEX: 26 KG/M2 | WEIGHT: 175.5 LBS | HEART RATE: 92 BPM | HEIGHT: 69 IN | SYSTOLIC BLOOD PRESSURE: 130 MMHG

## 2019-05-28 DIAGNOSIS — M48.062 SPINAL STENOSIS OF LUMBAR REGION WITH NEUROGENIC CLAUDICATION: ICD-10-CM

## 2019-05-28 DIAGNOSIS — M70.62 GREATER TROCHANTERIC BURSITIS, LEFT: Primary | ICD-10-CM

## 2019-05-28 PROCEDURE — 99214 OFFICE O/P EST MOD 30 MIN: CPT | Performed by: PHYSICAL MEDICINE & REHABILITATION

## 2019-05-28 PROCEDURE — 20610 DRAIN/INJ JOINT/BURSA W/O US: CPT | Performed by: PHYSICAL MEDICINE & REHABILITATION

## 2019-05-28 RX ORDER — TRIAMCINOLONE ACETONIDE 40 MG/ML
40 INJECTION, SUSPENSION INTRA-ARTICULAR; INTRAMUSCULAR ONCE
Status: COMPLETED | OUTPATIENT
Start: 2019-05-28 | End: 2019-05-28

## 2019-05-28 RX ORDER — LIDOCAINE HYDROCHLORIDE 10 MG/ML
4 INJECTION, SOLUTION INFILTRATION; PERINEURAL ONCE
Status: COMPLETED | OUTPATIENT
Start: 2019-05-28 | End: 2019-05-28

## 2019-05-28 RX ADMIN — LIDOCAINE HYDROCHLORIDE 4 ML: 10 INJECTION, SOLUTION INFILTRATION; PERINEURAL at 13:51

## 2019-05-28 RX ADMIN — TRIAMCINOLONE ACETONIDE 40 MG: 40 INJECTION, SUSPENSION INTRA-ARTICULAR; INTRAMUSCULAR at 13:51

## 2019-05-28 NOTE — PROGRESS NOTES
Toya Mcfadden, [id number] [facility] Physical Medicine and Rehabilitation  [address] [facility] [place]  Phone: [phone]  Fax: [phone]    PCP: Denny Henderson MD  Date of visit: 5/28/19    Chief Complaint   Patient presents with    Back Pain    Hip Pain       Interval:   Patient presents today for injection follow up. He underwent L4-5 ILESI and reports almost complete relief of his cramps and back pain. He is able to ride his motorcycle without cramping which makes him very happy. He does have left hip pain which he thinks is his bursitis acting up again. He started gabapentin and is unsure if it is causing side effects. He feels \"spacy\" and has had dizziness and two falls in the past 2 weeks. In this time though, he has also been on two antibiotics for a dog bite and has Meniere's. He is unsure if the epidural, the neurontin or him being off of work for the past 3 weeks has helped his pain. The pain is rated Pain Score:   2. He states his right shin feels cold and wet. . There is associated numbness/tingling in legs. There is no weakness. There is no bowel/bladder changes. The prior workup has included: Xray hip, MRI lumbar spine    The prior treatment has included:  PT: completed   Modalities: none   OTC Tylenol: none    NSAIDS: aleve, naprosyn with some relief   Opioids: none   Membrane stabilizers: none    Muscle relaxers: none   Previous injections: left GT bursa- 100% relief   L4-5 ILESI  Previous surgery at this site: none      Allergies   Allergen Reactions    Penicillins        Current Outpatient Medications   Medication Sig Dispense Refill    Vitamins-Lipotropics (LIPO-FLAVONOID PLUS PO) two with each meal      gabapentin (NEURONTIN) 300 MG capsule Take 1 capsule by mouth 3 times daily for 30 days.  90 capsule 2    Multiple Vitamins-Minerals (CENTRUM SILVER ADULT 50+ PO) Take by mouth       Current Facility-Administered Medications   Medication Dose Route Frequency Provider Last Rate Last Dose    lidocaine 1 % injection 4 mL  4 mL Other Once Daniella Dowd DO        triamcinolone acetonide (KENALOG-40) injection 40 mg  40 mg Intra-articular Once Alejandro Fang DO           Past Medical History:   Diagnosis Date    DDD (degenerative disc disease), lumbar 11/5/2018    Meniere disease     Prostate cancer ([facility] [address] 09/2018       Past Surgical History:   Procedure Laterality Date    EPIDURAL STEROID INJECTION N/A 5/2/2019    L4-5 INTERLAMINAR EPIDURAL STEROID INJECTION performed by Alejandro Fang DO at [place] [address] N/A 05/02/2019    l4-5 INTERLAMINAR EPIDURAL STEROID INJECTION    TONSILLECTOMY      childhood       Family History   Problem Relation Age of Onset    Prostate Cancer Brother      Social History     Tobacco Use    Smoking status: Never Smoker    Smokeless tobacco: Never Used   Substance Use Topics    Alcohol use: No    Drug use: No       Functional Status: The patient is able to ambulate and perform activities of daily living without the use of an assistive device. Occupation: The patient is currently employed. ROS:    Constitutional: Denies fevers, chills, night sweats, unintentional weight loss     Skin: Denies rash or skin changes     Eyes: Denies vision changes    Ears/Nose/Throat: Denies nasal congestion or sore throat     Respiratory: Denies SOB or cough     Cardiovascular: Denies CP, palpitations, edema      Gastrointestinal: Denies abdominal pain,  N/V, constipation, or diarrhea    Genitourinary: Denies urinary symptoms    Neurologic: See HPI.     MSK: See HPI. Psychiatric: Denies sleep disturbance, anxiety, depression    Hematologic/Lymphatic/Immunologic: Denies bruising       Physical Exam:   Blood pressure 130/76, pulse 92, height 5' 9\" (1.753 m), weight 175 lb 8 oz (79.6 kg). General: well developed and well nourished in no acute distress.  Body habitus is normal.   HEENT: No rhinorrhea, sneezing, yawning, or lacrimation. No scleral icterus or conjunctival injection. Resp: symmetrical chest expansion, unlabored breathing, respirations unlabored. CV: Heart rate is regular. Peripheral pulses are palpable  Lymph: No visible regional lymphadenopathy. Skin: No rashes or ecchymosis. Normal turgor. Psych: Mood is calm. Affect is normal.   Ext: No edema noted     MSK:   Back/Hip Exam:   Inspection: Pelvis was asymmetric. Lumbar lordotic curvature was decreased. There was no scoliosis. No ecchymoses or erythema. glute muscle atrophied bilaterally. Palpation: Palpatory exam revealed no tenderness along lumbosacral paraspinals, midline spine, SI joint sulcus, ttp left greater trochanter and gluteus medius muscle. There was paraspinal spasms. There were no trigger points. ROM: ROM decreased  Special/provocative testing:   Supine SLR negative    negative FABERS. Neurological Exam:  Strength:   R  L  Hip Flex  5  5  Knee Ext  5  5  Ankle dorsi  5  5  EHL   5- 5-  Ankle Plantar  5  5    Sensory:  Decreased sensation left S1 dermatome     Reflexes:   R  L  Patellar  (2+) (2+)  Ankle Jerk  (tr) (2+)        Gait is Antalgic. MRI L spine   For purposes of this study, the last fully formed disc is considered   L5-S1 and corresponds to axial image 30 of series 6.       There is nonspecific straightening of the lumbar lordosis. Vertebral   bodies maintain normal height. There is trace retrolisthesis of L5 on   S1. There is a small Schmorl's node at the superior endplate of L3   with adjacent marrow edema. There are degenerative endplate changes at   N9-P9. No acute fracture is identified.       Conus medullaris terminates at T12-L1 and is normal in caliber and   signal. There is decreased AP diameter of the central canal on the   basis of short pedicles.       There is multilevel intervertebral disc desiccation.       T12-L1: No disc herniation.  No central canal or foraminal narrowing.       L1-L2: No disc herniation. Mild bilateral facet hypertrophy. Mild   central canal narrowing. No foraminal narrowing.       L2-L3: Diffuse disc bulge. Bilateral facet hypertrophy and ligamentum   flavum infolding. Moderate central canal and severe left subarticular   recess narrowing. Mild bilateral foraminal narrowing.       L3-L4: Diffuse disc bulge. Mild bilateral facet hypertrophy and   ligamentum flavum infolding. Mild central canal and severe bilateral   subarticular recess narrowing. Mild to moderate bilateral foraminal   narrowing.       L4-L5: Diffuse disc bulge. Bilateral facet hypertrophy. There is trace   fluid within bilateral facet joints with adjacent soft tissue edema on   the left. No central canal narrowing. Mild to moderate bilateral   subarticular recess and foraminal narrowing.       L5-S1: Mild diffuse disc bulge. Mild facet hypertrophy. No central   canal narrowing. Mild to moderate bilateral foraminal narrowing.           Impression   1. 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 2. Mild central canal and moderate to severe bilateral subarticular   recess narrowing at L3-L4. 3. Mild to moderate bilateral foraminal narrowing from L3-L4 to L5-S1.       4. Fluid within the L4-L5 facet joints with adjacent soft tissue edema   on the left, suggestive of facet synovitis. Impression:   Katie Toth is a 62 y.o. male     1. Greater trochanteric bursitis, left    2. Spinal stenosis of lumbar region with neurogenic claudication        Plan:   · Did well with MARQUES. · Inject left greater trochanteric bursa injection today   · Will taper neurontin to stop and monitor how he feels.  Discussed going back on neurontin if the dizziness is not from the neurontin and it was helping vs trying lyrica. The patient was educated about the diagnosis, prognosis, indications, risks and benefits of treatment. An opportunity to ask questions was given to the patient and questions were answered. The patient agreed to proceed with the recommended treatment as described above. Follow up - will call     Michael Garcia DO [facility]   Board Certified Physical Medicine and Rehabilitation      PROCEDURE NOTE:  Pre-procedure Verification: verified patient, procedure and site  Site Marked: Yes  Timeout: completed prior to procedure    After having explained the potential risks (including but not limited to infection, bleeding, skin color change, post-injection soreness, hyperglycemia) and benefits of the left greater trochanteric bursa injection, the patient gave verbal and written consent to proceed. The area was prepped in aseptic fashion with Betadine. A 1.5 inch 22g needle was directed into the above area. The injection was completed with 1 mL of Kenalog 40mg/mL mixed with 4 mL of 1% lidocaine after no blood was aspirated on pull back. The patient tolerated the procedure without difficulty and was instructed on post-injection care including the use of ice and elevation for 10-15 minutes at a time for post-injection soreness. If the patient develops severe pain, fevers, redness, swelling, they should call our office or go to ED for evaluation.

## 2019-08-13 ENCOUNTER — OFFICE VISIT (OUTPATIENT)
Dept: FAMILY MEDICINE CLINIC | Age: 64
End: 2019-08-13
Payer: COMMERCIAL

## 2019-08-13 VITALS
WEIGHT: 170 LBS | SYSTOLIC BLOOD PRESSURE: 124 MMHG | BODY MASS INDEX: 25.18 KG/M2 | HEIGHT: 69 IN | HEART RATE: 80 BPM | DIASTOLIC BLOOD PRESSURE: 80 MMHG

## 2019-08-13 DIAGNOSIS — Z76.89 ENCOUNTER TO ESTABLISH CARE: Primary | ICD-10-CM

## 2019-08-13 DIAGNOSIS — Z11.4 SCREENING FOR HIV (HUMAN IMMUNODEFICIENCY VIRUS): ICD-10-CM

## 2019-08-13 DIAGNOSIS — Z12.11 COLON CANCER SCREENING: ICD-10-CM

## 2019-08-13 DIAGNOSIS — Z11.59 NEED FOR HEPATITIS C SCREENING TEST: ICD-10-CM

## 2019-08-13 DIAGNOSIS — Z13.220 NEED FOR LIPID SCREENING: ICD-10-CM

## 2019-08-13 PROCEDURE — 99203 OFFICE O/P NEW LOW 30 MIN: CPT | Performed by: FAMILY MEDICINE

## 2019-08-13 ASSESSMENT — ENCOUNTER SYMPTOMS
ABDOMINAL PAIN: 0
BLOOD IN STOOL: 1
TROUBLE SWALLOWING: 0
COUGH: 0
BACK PAIN: 1
CHOKING: 0
SORE THROAT: 0
SHORTNESS OF BREATH: 0

## 2019-08-13 ASSESSMENT — PATIENT HEALTH QUESTIONNAIRE - PHQ9
SUM OF ALL RESPONSES TO PHQ QUESTIONS 1-9: 0
SUM OF ALL RESPONSES TO PHQ9 QUESTIONS 1 & 2: 0
1. LITTLE INTEREST OR PLEASURE IN DOING THINGS: 0
2. FEELING DOWN, DEPRESSED OR HOPELESS: 0
SUM OF ALL RESPONSES TO PHQ QUESTIONS 1-9: 0

## 2019-10-11 ENCOUNTER — HOSPITAL ENCOUNTER (OUTPATIENT)
Age: 64
Discharge: HOME | End: 2019-10-13
Payer: COMMERCIAL

## 2019-10-11 DIAGNOSIS — Z13.220 NEED FOR LIPID SCREENING: ICD-10-CM

## 2019-10-11 DIAGNOSIS — Z11.59 NEED FOR HEPATITIS C SCREENING TEST: ICD-10-CM

## 2019-10-11 DIAGNOSIS — Z11.4 SCREENING FOR HIV (HUMAN IMMUNODEFICIENCY VIRUS): ICD-10-CM

## 2019-10-11 LAB
CHOLESTEROL, TOTAL: 188 MG/DL (ref 0–199)
HDLC SERPL-MCNC: 52 MG/DL
LDL CHOLESTEROL CALCULATED: 103 MG/DL (ref 0–99)
TRIGL SERPL-MCNC: 164 MG/DL (ref 0–149)
VLDLC SERPL CALC-MCNC: 33 MG/DL

## 2019-10-11 PROCEDURE — 36415 COLL VENOUS BLD VENIPUNCTURE: CPT

## 2019-10-11 PROCEDURE — 86803 HEPATITIS C AB TEST: CPT

## 2019-10-11 PROCEDURE — 86703 HIV-1/HIV-2 1 RESULT ANTBDY: CPT

## 2019-10-11 PROCEDURE — 80061 LIPID PANEL: CPT

## 2019-10-14 ENCOUNTER — TELEPHONE (OUTPATIENT)
Dept: FAMILY MEDICINE CLINIC | Age: 64
End: 2019-10-14

## 2019-10-14 LAB
HEPATITIS C ANTIBODY INTERPRETATION: NORMAL
HIV-1 AND HIV-2 ANTIBODIES: NORMAL

## 2019-10-17 ENCOUNTER — OFFICE VISIT (OUTPATIENT)
Dept: FAMILY MEDICINE CLINIC | Age: 64
End: 2019-10-17
Payer: COMMERCIAL

## 2019-10-17 VITALS
OXYGEN SATURATION: 96 % | BODY MASS INDEX: 25.33 KG/M2 | SYSTOLIC BLOOD PRESSURE: 126 MMHG | WEIGHT: 171 LBS | DIASTOLIC BLOOD PRESSURE: 68 MMHG | HEART RATE: 92 BPM | HEIGHT: 69 IN | TEMPERATURE: 97.4 F

## 2019-10-17 DIAGNOSIS — R09.89 GLOBUS SENSATION: ICD-10-CM

## 2019-10-17 DIAGNOSIS — C61 PROSTATE CANCER (HCC): ICD-10-CM

## 2019-10-17 DIAGNOSIS — Z12.11 COLON CANCER SCREENING: ICD-10-CM

## 2019-10-17 DIAGNOSIS — E78.5 HYPERLIPIDEMIA, UNSPECIFIED HYPERLIPIDEMIA TYPE: Primary | ICD-10-CM

## 2019-10-17 PROBLEM — R09.A2 GLOBUS SENSATION: Status: ACTIVE | Noted: 2019-10-17

## 2019-10-17 PROCEDURE — 99213 OFFICE O/P EST LOW 20 MIN: CPT | Performed by: FAMILY MEDICINE

## 2019-10-17 ASSESSMENT — ENCOUNTER SYMPTOMS
BACK PAIN: 1
COUGH: 0
CHOKING: 0
BLOOD IN STOOL: 1
SORE THROAT: 0
ABDOMINAL PAIN: 0
SHORTNESS OF BREATH: 0
VOICE CHANGE: 0
TROUBLE SWALLOWING: 0

## 2020-10-29 ENCOUNTER — OFFICE VISIT (OUTPATIENT)
Dept: FAMILY MEDICINE CLINIC | Age: 65
End: 2020-10-29
Payer: COMMERCIAL

## 2020-10-29 VITALS
SYSTOLIC BLOOD PRESSURE: 130 MMHG | OXYGEN SATURATION: 98 % | DIASTOLIC BLOOD PRESSURE: 80 MMHG | HEART RATE: 75 BPM | BODY MASS INDEX: 25.92 KG/M2 | TEMPERATURE: 97.6 F | WEIGHT: 175 LBS | HEIGHT: 69 IN

## 2020-10-29 PROBLEM — D09.9 INTRAEPITHELIAL SQUAMOUS CELL CARCINOMA: Status: RESOLVED | Noted: 2019-12-26 | Resolved: 2020-10-29

## 2020-10-29 PROBLEM — J45.20 MILD INTERMITTENT ASTHMA WITHOUT COMPLICATION: Status: ACTIVE | Noted: 2020-10-29

## 2020-10-29 PROBLEM — D09.9 INTRAEPITHELIAL SQUAMOUS CELL CARCINOMA: Status: ACTIVE | Noted: 2019-12-26

## 2020-10-29 PROBLEM — R09.A2 GLOBUS SENSATION: Status: RESOLVED | Noted: 2019-10-17 | Resolved: 2020-10-29

## 2020-10-29 PROBLEM — R09.89 GLOBUS SENSATION: Status: RESOLVED | Noted: 2019-10-17 | Resolved: 2020-10-29

## 2020-10-29 PROBLEM — H81.09 MENIERE'S DISEASE: Status: RESOLVED | Noted: 2020-10-29 | Resolved: 2020-10-29

## 2020-10-29 PROBLEM — H81.09 MENIERE'S DISEASE: Status: ACTIVE | Noted: 2020-10-29

## 2020-10-29 PROCEDURE — 99396 PREV VISIT EST AGE 40-64: CPT | Performed by: FAMILY MEDICINE

## 2020-10-29 RX ORDER — TADALAFIL 5 MG/1
TABLET ORAL
COMMUNITY
Start: 2020-10-13

## 2020-10-29 RX ORDER — ALBUTEROL SULFATE 90 UG/1
2 AEROSOL, METERED RESPIRATORY (INHALATION) 4 TIMES DAILY PRN
Qty: 1 INHALER | Refills: 1 | Status: SHIPPED | OUTPATIENT
Start: 2020-10-29 | End: 2021-06-14

## 2020-10-29 RX ORDER — TAMSULOSIN HYDROCHLORIDE 0.4 MG/1
0.4 CAPSULE ORAL DAILY
COMMUNITY
Start: 2020-08-13

## 2020-10-29 ASSESSMENT — ENCOUNTER SYMPTOMS
VOMITING: 0
CONSTIPATION: 0
ABDOMINAL PAIN: 0
NAUSEA: 0
SORE THROAT: 0
BACK PAIN: 1
RHINORRHEA: 0
DIARRHEA: 0
COUGH: 0
SINUS PAIN: 0
SHORTNESS OF BREATH: 0

## 2020-10-29 ASSESSMENT — PATIENT HEALTH QUESTIONNAIRE - PHQ9
2. FEELING DOWN, DEPRESSED OR HOPELESS: 0
SUM OF ALL RESPONSES TO PHQ QUESTIONS 1-9: 0
SUM OF ALL RESPONSES TO PHQ9 QUESTIONS 1 & 2: 0
SUM OF ALL RESPONSES TO PHQ QUESTIONS 1-9: 0
SUM OF ALL RESPONSES TO PHQ QUESTIONS 1-9: 0
1. LITTLE INTEREST OR PLEASURE IN DOING THINGS: 0

## 2020-10-29 NOTE — PROGRESS NOTES
10/29/2020    Chief Complaint   Patient presents with   Lylia Apgar Doctor     NTP former Dr Agustina Cedillo patient. Patient does follow with  CC for Prostate CA. Patients sees Dr Michelle Araujo for urology and Dr Jose witt(scolosis, disc disease)     Asthma     Patient does has asthma but inhaler is  (pro Air) patient is requesting a renewal        Carroll Mitchell (:  1955) is a 60 y.o. male, here for establishing care. They report a PMH of:  Past Medical History:   Diagnosis Date    Asthma without status asthmaticus 1/15/2010    DDD (degenerative disc disease), lumbar 2018    Intraepithelial squamous cell carcinoma 2019    Meniere disease     Prostate cancer ([facility] [address] 2018    Pure hypercholesterolemia 1/15/2010     Social and family histories reviewed and updated as appropriate. He was previously a patient of Dr. Agustina Cedillo  He also follows with Urology, PMR and [facility] (Rad/Onc, Urology)  He has seen GI locally (Dr. Zulema Ash)  [place], retiring soon  Enjoys riding and being active    Annual exam:  Patient is here for routine yearly physical/preventative visit. Patient has no complaints or concerns today. Patient is  generally healthy. Chronic medical problems include asthma, prostate CA. These are generally controlled. /80 today. Most recent labs reviewed with patient and  are not remarkable. Health maintenance reviewed with patient and is  up to date. Patient does not smoke. Patient does not drink alcohol. Patient  does not use drugs. Overall doing well. Patient's pastmedical, surgical, social and/or family history reviewed, updated in chart, and are non-contributory (unless otherwise stated). Medications and allergies also reviewed and updated in chart.       Asthma  Current treatment: Albuterol as needed  Recent medication changes: None  Symptoms are controlled  He is a non-smoker  He has not previously been hospitalized for asthma related issues  He has not previously been on controller medication    Prostate CA  Current treatment: Radiation therapy, Cialis, and tamsulosin  Recent medication changes: None  Following with [facility]  Symptoms are controlled    The 10-year ASCVD risk score (Suzan Story, et al., 2013) is: 11.3%    Values used to calculate the score:      Age: 60 years      Sex: Male      Is Non- : No      Diabetic: No      Tobacco smoker: No      Systolic Blood Pressure: 766 mmHg      Is BP treated: No      HDL Cholesterol: 52 mg/dL      Total Cholesterol: 188 mg/dL    Review of Systems   Constitutional: Negative for chills, fatigue and fever. HENT: Negative for congestion, rhinorrhea, sinus pain and sore throat. Respiratory: Negative for cough and shortness of breath. Cardiovascular: Negative for chest pain, palpitations and leg swelling. Gastrointestinal: Negative for abdominal pain, constipation, diarrhea, nausea and vomiting. Genitourinary: Negative for difficulty urinating. Musculoskeletal: Positive for back pain. Negative for arthralgias and gait problem. Skin: Negative for rash. Neurological: Negative for dizziness, weakness and numbness. Hematological: Does not bruise/bleed easily. Prior to Visit Medications    Medication Sig Taking?  Authorizing Provider   tamsulosin (FLOMAX) 0.4 MG capsule Take 0.4 mg by mouth 2 times daily  Yes Historical Provider, MD   tadalafil (CIALIS) 5 MG tablet TAKE 1 TABLET BY MOUTH AS NEEDED (FOR ERECTILE DYSFUNCTION) Yes Historical Provider, MD   Multiple Vitamins-Minerals (CENTRUM SILVER ADULT 50+ PO) Take by mouth Yes Historical Provider, MD   Vitamins-Lipotropics (LIPO-FLAVONOID PLUS PO) two with each meal Yes Historical Provider, MD        Allergies   Allergen Reactions    Penicillins        Past Surgical History:   Procedure Laterality Date    EPIDURAL STEROID INJECTION N/A 5/2/2019    L4-5 INTERLAMINAR EPIDURAL STEROID INJECTION performed by Paul Pires, DO at [address] [facility] OTHER SURGICAL HISTORY N/A 05/02/2019    l4-5 INTERLAMINAR EPIDURAL STEROID INJECTION    TONSILLECTOMY      childhood       Social History     Socioeconomic History    Marital status:      Spouse name: Not on file    Number of children: Not on file    Years of education: Not on file    Highest education level: Not on file   Occupational History    Not on file   Social Needs    Financial resource strain: Not on file    Food insecurity     Worry: Not on file     Inability: Not on file    Transportation needs     Medical: Not on file     Non-medical: Not on file   Tobacco Use    Smoking status: Never Smoker    Smokeless tobacco: Never Used   Substance and Sexual Activity    Alcohol use: No    Drug use: No    Sexual activity: Never   Lifestyle    Physical activity     Days per week: Not on file     Minutes per session: Not on file    Stress: Not on file   Relationships    Social connections     Talks on phone: Not on file     Gets together: Not on file     Attends Anabaptism service: Not on file     Active member of club or organization: Not on file     Attends meetings of clubs or organizations: Not on file     Relationship status: Not on file    Intimate partner violence     Fear of current or ex partner: Not on file     Emotionally abused: Not on file     Physically abused: Not on file     Forced sexual activity: Not on file   Other Topics Concern    Not on file   Social History Narrative    Not on file        Family History   Problem Relation Age of Onset    Prostate Cancer Brother     Heart Disease Mother     Other Mother         GI bleed    Other Father         accident        Vitals:    10/29/20 1251   BP: 130/80   Pulse: 75   Temp: 97.6 °F (36.4 °C)   TempSrc: Temporal   SpO2: 98%   Weight: 175 lb (79.4 kg)   Height: 5' 9\" (1.753 m)     Estimated body mass index is 25.84 kg/m² as calculated from the following:    Height as of this encounter: 5' 9\" (1.753 m).    Weight as of this encounter: 175 lb (79.4 kg). Physical Exam  Constitutional:       General: He is not in acute distress. Appearance: He is well-developed. HENT:      Head: Normocephalic and atraumatic. Right Ear: External ear normal.      Left Ear: External ear normal.      Nose: Nose normal. No congestion or rhinorrhea. Eyes:      Extraocular Movements: Extraocular movements intact. Pupils: Pupils are equal, round, and reactive to light. Neck:      Musculoskeletal: Normal range of motion. Thyroid: No thyromegaly. Cardiovascular:      Rate and Rhythm: Normal rate and regular rhythm. Pulmonary:      Effort: Pulmonary effort is normal. No respiratory distress. Breath sounds: Normal breath sounds. No wheezing or rales. Abdominal:      General: There is no distension. Palpations: Abdomen is soft. Tenderness: There is no abdominal tenderness. Musculoskeletal:         General: No swelling or deformity. Skin:     General: Skin is warm. Findings: No rash. Neurological:      General: No focal deficit present. Mental Status: He is alert. Mental status is at baseline. Psychiatric:         Mood and Affect: Mood normal.         Behavior: Behavior normal.         ASSESSMENT/PLAN:  Suzette Marquez was seen today for established new doctor and asthma. Diagnoses and all orders for this visit:    Encounter to establish care    Annual physical exam    Mild intermittent asthma without complication  -     albuterol sulfate HFA (VENTOLIN HFA) 108 (90 Base) MCG/ACT inhaler; Inhale 2 puffs into the lungs 4 times daily as needed for Wheezing    Screening for hyperlipidemia  -     Comprehensive Metabolic Panel; Future  -     LIPID PANEL; Future  -     CBC Auto Differential; Future    Screening for diabetes mellitus  -     Comprehensive Metabolic Panel;  Future  -     HEMOGLOBIN A1C; Future  -     CBC Auto Differential; Future    Prostate cancer [facility])      Additional plan and future considerations:   As above. EMR reviewed.   Return to office in 6 months with labs drawn 1 week prior for Medicare annual wellness visit or sooner if needed    Future Appointments   Date Time Provider Shemar Guerrero   5/5/2021  9:30 AM Kamille Galindo DO [address] DO on 10/29/2020 at 1:09 PM

## 2020-11-12 ENCOUNTER — TELEPHONE (OUTPATIENT)
Dept: FAMILY MEDICINE CLINIC | Age: 65
End: 2020-11-12

## 2020-11-12 RX ORDER — ALBUTEROL SULFATE 90 UG/1
2 AEROSOL, METERED RESPIRATORY (INHALATION) EVERY 6 HOURS PRN
Qty: 1 INHALER | Refills: 1 | Status: SHIPPED
Start: 2020-11-12 | End: 2022-03-22 | Stop reason: ALTCHOICE

## 2020-11-12 NOTE — TELEPHONE ENCOUNTER
Carine Tellez called said that his Rx for Ventolin will cost 68$  He normally gets Proair and it cost 18$. He said he [facility]ried [facility] [place] and [facility] in [place], please give new script for [place] [facility].  The pharmacist said because the script said Ventolin they cant change it to [place] [facility]

## 2021-06-14 ENCOUNTER — OFFICE VISIT (OUTPATIENT)
Dept: FAMILY MEDICINE CLINIC | Age: 66
End: 2021-06-14
Payer: MEDICARE

## 2021-06-14 VITALS
BODY MASS INDEX: 25.62 KG/M2 | WEIGHT: 173 LBS | SYSTOLIC BLOOD PRESSURE: 130 MMHG | OXYGEN SATURATION: 98 % | HEIGHT: 69 IN | RESPIRATION RATE: 18 BRPM | HEART RATE: 75 BPM | TEMPERATURE: 97.5 F | DIASTOLIC BLOOD PRESSURE: 80 MMHG

## 2021-06-14 DIAGNOSIS — Z00.00 ROUTINE GENERAL MEDICAL EXAMINATION AT A HEALTH CARE FACILITY: Primary | ICD-10-CM

## 2021-06-14 DIAGNOSIS — K64.9 HEMORRHOIDS, UNSPECIFIED HEMORRHOID TYPE: ICD-10-CM

## 2021-06-14 DIAGNOSIS — E78.5 DYSLIPIDEMIA: ICD-10-CM

## 2021-06-14 DIAGNOSIS — J45.20 MILD INTERMITTENT ASTHMA WITHOUT COMPLICATION: ICD-10-CM

## 2021-06-14 DIAGNOSIS — Z13.1 SCREENING FOR DIABETES MELLITUS: ICD-10-CM

## 2021-06-14 DIAGNOSIS — R79.9 ABNORMAL FINDING OF BLOOD CHEMISTRY, UNSPECIFIED: ICD-10-CM

## 2021-06-14 DIAGNOSIS — C61 PROSTATE CANCER (HCC): ICD-10-CM

## 2021-06-14 PROCEDURE — 1123F ACP DISCUSS/DSCN MKR DOCD: CPT | Performed by: FAMILY MEDICINE

## 2021-06-14 PROCEDURE — 3017F COLORECTAL CA SCREEN DOC REV: CPT | Performed by: FAMILY MEDICINE

## 2021-06-14 PROCEDURE — G0402 INITIAL PREVENTIVE EXAM: HCPCS | Performed by: FAMILY MEDICINE

## 2021-06-14 PROCEDURE — 4040F PNEUMOC VAC/ADMIN/RCVD: CPT | Performed by: FAMILY MEDICINE

## 2021-06-14 RX ORDER — FINASTERIDE 5 MG/1
5 TABLET, FILM COATED ORAL DAILY
COMMUNITY
Start: 2021-04-20

## 2021-06-14 ASSESSMENT — LIFESTYLE VARIABLES
HOW OFTEN DO YOU HAVE SIX OR MORE DRINKS ON ONE OCCASION: 0
HOW OFTEN DURING THE LAST YEAR HAVE YOU FAILED TO DO WHAT WAS NORMALLY EXPECTED FROM YOU BECAUSE OF DRINKING: 0
HOW OFTEN DURING THE LAST YEAR HAVE YOU HAD A FEELING OF GUILT OR REMORSE AFTER DRINKING: 0
AUDIT-C TOTAL SCORE: 4
HOW OFTEN DURING THE LAST YEAR HAVE YOU BEEN UNABLE TO REMEMBER WHAT HAPPENED THE NIGHT BEFORE BECAUSE YOU HAD BEEN DRINKING: 0
HAS A RELATIVE, FRIEND, DOCTOR, OR ANOTHER HEALTH PROFESSIONAL EXPRESSED CONCERN ABOUT YOUR DRINKING OR SUGGESTED YOU CUT DOWN: 0
HAVE YOU OR SOMEONE ELSE BEEN INJURED AS A RESULT OF YOUR DRINKING: 0
AUDIT TOTAL SCORE: 4
HOW MANY STANDARD DRINKS CONTAINING ALCOHOL DO YOU HAVE ON A TYPICAL DAY: 0
HOW OFTEN DURING THE LAST YEAR HAVE YOU NEEDED AN ALCOHOLIC DRINK FIRST THING IN THE MORNING TO GET YOURSELF GOING AFTER A NIGHT OF HEAVY DRINKING: 0
HOW OFTEN DURING THE LAST YEAR HAVE YOU FOUND THAT YOU WERE NOT ABLE TO STOP DRINKING ONCE YOU HAD STARTED: 0
HOW OFTEN DO YOU HAVE A DRINK CONTAINING ALCOHOL: 4

## 2021-06-14 ASSESSMENT — PATIENT HEALTH QUESTIONNAIRE - PHQ9
SUM OF ALL RESPONSES TO PHQ9 QUESTIONS 1 & 2: 0
SUM OF ALL RESPONSES TO PHQ QUESTIONS 1-9: 0
SUM OF ALL RESPONSES TO PHQ QUESTIONS 1-9: 0
1. LITTLE INTEREST OR PLEASURE IN DOING THINGS: 0
2. FEELING DOWN, DEPRESSED OR HOPELESS: 0
SUM OF ALL RESPONSES TO PHQ QUESTIONS 1-9: 0

## 2021-06-14 NOTE — PROGRESS NOTES
Medicare Annual Wellness Visit  Name: Promise Linares Date: 2021   MRN: <[id number]> Sex: Male   Age: 73 y.o. Ethnicity: Non-/Non    : 1955 Race: Luz Das is here for Medicare AWV (didnt complete labs )    Screenings for behavioral, psychosocial and functional/safety risks, and cognitive dysfunction are all negative except as indicated below. These results, as well as other patient data from the [address] form, are documented in Flowsheets linked to this Encounter. He also follows with Urology, PMR and [facility] (Rad/Onc, Urology)  He has seen GI locally (Dr. Suyapa Vaughn)    Asthma  Current treatment: Albuterol as needed  Recent medication changes: None    Prostate CA  Current treatment: Radiation therapy, Cialis, and tamsulosin  Recent medication changes: None  Following with [facility]    The 10-year ASCVD risk score (Fabircio King, et al., 2013) is: 12.2%    Values used to calculate the score:      Age: 73 years      Sex: Male      Is Non- : No      Diabetic: No      Tobacco smoker: No      Systolic Blood Pressure: 801 mmHg      Is BP treated: No      HDL Cholesterol: 52 mg/dL      Total Cholesterol: 188 mg/dL      Allergies   Allergen Reactions    Penicillins          Prior to Visit Medications    Medication Sig Taking?  Authorizing Provider   finasteride (PROSCAR) 5 MG tablet  Yes Historical Provider, MD   albuterol sulfate HFA (PROAIR HFA) 108 (90 Base) MCG/ACT inhaler Inhale 2 puffs into the lungs every 6 hours as needed for Wheezing Yes Wong Lee DO   tamsulosin (FLOMAX) 0.4 MG capsule Take 0.4 mg by mouth 2 times daily  Yes Historical Provider, MD   tadalafil (CIALIS) 5 MG tablet TAKE 1 TABLET BY MOUTH AS NEEDED (FOR ERECTILE DYSFUNCTION) Yes Historical Provider, MD   Multiple Vitamins-Minerals (CENTRUM SILVER ADULT 50+ PO) Take by mouth Yes Historical Provider, MD   Vitamins-Lipotropics (LIPO-FLAVONOID PLUS PO) two with each meal Yes Historical Provider, MD       Past Medical History:   Diagnosis Date    Asthma without status asthmaticus 1/15/2010    DDD (degenerative disc disease), lumbar 11/5/2018    Intraepithelial squamous cell carcinoma 12/26/2019    Meniere disease     Prostate cancer ([facility] [address] 09/2018    Pure hypercholesterolemia 1/15/2010       Past Surgical History:   Procedure Laterality Date    EPIDURAL STEROID INJECTION N/A 5/2/2019    L4-5 INTERLAMINAR EPIDURAL STEROID INJECTION performed by Marco Martinez DO at [place] [address] N/A 05/02/2019    l4-5 INTERLAMINAR EPIDURAL STEROID INJECTION    TONSILLECTOMY      childhood       Family History   Problem Relation Age of Onset    Prostate Cancer Brother     Heart Disease Mother     Other Mother         GI bleed    Other Father         accident        CareTeam (Including outside providers/suppliers regularly involved in providing care):   Patient Care Team:  Ej Odonnell DO as PCP - General (Family Medicine)  Ej Odonnell DO as PCP - [facility] Empaneled Provider  MARIO Allen - CNP (Radiation Oncology)    Wt Readings from Last 3 Encounters:   06/14/21 173 lb (78.5 kg)   10/29/20 175 lb (79.4 kg)   10/17/19 171 lb (77.6 kg)     Vitals:    06/14/21 1428   BP: 130/80   Pulse: 75   Resp: 18   Temp: 97.5 °F (36.4 °C)   TempSrc: Temporal   SpO2: 98%   Weight: 173 lb (78.5 kg)   Height: 5' 9\" (1.753 m)     Body mass index is 25.55 kg/m². Based upon direct observation of the patient, evaluation of cognition reveals recent and remote memory intact. Patient's complete Health Risk Assessment and screening values have been reviewed and are found in Flowsheets. The following problems were reviewed today and where indicated follow up appointments were made and/or referrals ordered.     Positive Risk Factor Screenings with Interventions:          General Health and ACP:  General  In general, how would you say your health is?: Very Good In the past 7 days, have you experienced any of the following? New or Increased Pain, New or Increased Fatigue, Loneliness, Social Isolation, Stress or Anger?: None of These  Do you get the social and emotional support that you need?: (!) No  Do you have a Living Will?: Yes  Advance Directives     Power of [employer] Will ACP-Advance Directive ACP-Power of     Not on File Not on File Not on File Not on File      General Health Risk Interventions:  · N/A        Personalized Preventive Plan   Current Health Maintenance Status  Immunization History   Administered Date(s) Administered    Influenza, MDCK Quadv, IM, PF (Flucelvax 4 yrs and older) 09/11/2020    Influenza, Quadv, IM, PF (6 mo and older Fluzone, Flulaval, Fluarix, and 3 yrs and older Afluria) 11/03/2018, 10/11/2019    Pneumococcal Conjugate 13-valent ([id number]) 01/22/2019    Pneumococcal Polysaccharide ([id number]) 01/30/2020    Td (Adult), 2 Lf Tetanus Toxoid, Pf (Td, Absorbed) 05/10/2019    Tdap (Boostrix, Adacel) 03/28/2013, 04/22/2013    Zoster Recombinant (Shingrix) 02/07/2020, 06/09/2020        Health Maintenance   Topic Date Due    Diabetes screen  Never done    PSA counseling  10/29/2021 (Originally 2/27/2006)    Lipid screen  10/11/2024    Pneumococcal 65+ years Vaccine (2 of 2 - PPSV23) 01/30/2025    DTaP/Tdap/Td vaccine (4 - Td or Tdap) 05/10/2029    Colon cancer screen colonoscopy  01/10/2030    Flu vaccine  Completed    Shingles Vaccine  Completed    COVID-19 Vaccine  Completed    Hepatitis C screen  Completed    HIV screen  Completed    Hepatitis A vaccine  Aged Out    Hepatitis B vaccine  Aged Out    Hib vaccine  Aged Out    Meningococcal (ACWY) vaccine  Aged Out     Recommendations for [employer] Due: see orders and patient instructions/AVS.  .   Recommended screening schedule for the next 5-10 years is provided to the patient in written form: see Patient Instructions/AVS.    Raquel Marie was seen today for medicare awv. Diagnoses and all orders for this visit:    Routine general medical examination at a health care facility    Mild intermittent asthma without complication    Prostate cancer ([facility] [address]    Hemorrhoids, unspecified hemorrhoid type  -     CBC Auto Differential; Future    Dyslipidemia  -     LIPID PANEL; Future  -     Comprehensive Metabolic Panel;  Future    Screening for diabetes mellitus  -     HEMOGLOBIN A1C; Future    Abnormal finding of blood chemistry, unspecified   -     HEMOGLOBIN A1C; Future

## 2021-06-14 NOTE — PATIENT INSTRUCTIONS
Personalized Preventive Plan for Carla Schwarz - 6/14/2021  Medicare offers a range of preventive health benefits. Some of the tests and screenings are paid in full while other may be subject to a deductible, co-insurance, and/or copay. Some of these benefits include a comprehensive review of your medical history including lifestyle, illnesses that may run in your family, and various assessments and screenings as appropriate. After reviewing your medical record and screening and assessments performed today your provider may have ordered immunizations, labs, imaging, and/or referrals for you. A list of these orders (if applicable) as well as your Preventive Care list are included within your After Visit Summary for your review. Other Preventive Recommendations:    · A preventive eye exam performed by an eye specialist is recommended every 1-2 years to screen for glaucoma; cataracts, macular degeneration, and other eye disorders. · A preventive dental visit is recommended every 6 months. · Try to get at least 150 minutes of exercise per week or 10,000 steps per day on a pedometer . · Order or download the FREE \"Exercise & Physical Activity: Your Everyday Guide\" from [employer]. Call 1-387.134.2910 or search [employer] online. · You need 7203-6664 mg of calcium and 0451-2797 IU of vitamin D per day. It is possible to meet your calcium requirement with diet alone, but a vitamin D supplement is usually necessary to meet this goal.  · When exposed to the sun, use a sunscreen that protects against both UVA and UVB radiation with an SPF of 30 or greater. Reapply every 2 to 3 hours or after sweating, drying off with a towel, or swimming. Always wear a seat belt when traveling in a car. Always wear a helmet when riding a bicycle or motorcycle.     Patient Education        Hemorrhoids: Care Instructions  Your Care Instructions     Hemorrhoids are enlarged veins that develop in the anal canal. Bleeding during bowel movements, itching, swelling, and rectal pain are the most common symptoms. They can be uncomfortable at times, but hemorrhoids rarely are a serious problem. You can treat most hemorrhoids with simple changes to your diet and bowel habits. These changes include eating more fiber and not straining to pass stools. Most hemorrhoids do not need surgery or other treatment unless they are very large and painful or bleed a lot. Follow-up care is a key part of your treatment and safety. Be sure to make and go to all appointments, and call your doctor if you are having problems. It's also a good idea to know your test results and keep a list of the medicines you take. How can you care for yourself at home? · Sit in a few inches of warm water (sitz bath) 3 times a day and after bowel movements. The warm water helps with pain and itching. · Put ice on your anal area several times a day for 10 minutes at a time. Put a thin cloth between the ice and your skin. Follow this by placing a warm, wet towel on the area for another 10 to 20 minutes. · Take pain medicines exactly as directed. ? If the doctor gave you a prescription medicine for pain, take it as prescribed. ? If you are not taking a prescription pain medicine, ask your doctor if you can take an over-the-counter medicine. · Keep the anal area clean, but be gentle. Use water and a fragrance-free soap, such as [place], or use baby wipes or medicated pads, such as Tucks. · Wear cotton underwear and loose clothing to decrease moisture in the anal area. · Eat more fiber. Include foods such as whole-grain breads and cereals, raw vegetables, raw and dried fruits, and beans. · Drink plenty of fluids. If you have kidney, heart, or liver disease and have to limit fluids, talk with your doctor before you increase the amount of fluids you drink. · Use a stool softener that contains bran or psyllium.  You can save money by buying bran or psyllium (available in bulk at most health food stores) and sprinkling it on foods or stirring it into fruit juice. Or you can use a product such as Metamucil or Hydrocil. · Practice healthy bowel habits. ? Go to the bathroom as soon as you have the urge. ? Avoid straining to pass stools. Relax and give yourself time to let things happen naturally. ? Do not hold your breath while passing stools. ? Do not read while sitting on the toilet. Get off the toilet as soon as you have finished. · Take your medicines exactly as prescribed. Call your doctor if you think you are having a problem with your medicine. When should you call for help? Call 911 anytime you think you may need emergency care. For example, call if:    · You pass maroon or very bloody stools. Call your doctor now or seek immediate medical care if:    · You have increased pain.     · You have increased bleeding. Watch closely for changes in your health, and be sure to contact your doctor if:    · Your symptoms have not improved after 3 or 4 days. Where can you learn more? Go to [email or web address]://[email or web address] org and sign in to your [employer] account. Enter J811 in the [facility] box to learn more about \"Hemorrhoids: Care Instructions. \"     If you do not have an account, please click on the \"Sign Up Now\" link. Current as of: April 15, 2020               Content Version: 12.8  © 9011-6344 Healthwise, [facility]. Care instructions adapted under license by [facility] ([facility]). If you have questions about a medical condition or this instruction, always ask your healthcare professional. [place] [address] any warranty or liability for your use of this information.

## 2021-06-15 DIAGNOSIS — R79.9 ABNORMAL FINDING OF BLOOD CHEMISTRY, UNSPECIFIED: ICD-10-CM

## 2021-06-15 DIAGNOSIS — K64.9 HEMORRHOIDS, UNSPECIFIED HEMORRHOID TYPE: ICD-10-CM

## 2021-06-15 DIAGNOSIS — E78.5 DYSLIPIDEMIA: ICD-10-CM

## 2021-06-15 DIAGNOSIS — Z13.1 SCREENING FOR DIABETES MELLITUS: ICD-10-CM

## 2021-06-15 LAB
ALBUMIN SERPL-MCNC: 4.3 G/DL (ref 3.5–5.2)
ALP BLD-CCNC: 63 U/L (ref 40–129)
ALT SERPL-CCNC: 25 U/L (ref 0–40)
ANION GAP SERPL CALCULATED.3IONS-SCNC: 15 MMOL/L (ref 7–16)
AST SERPL-CCNC: 28 U/L (ref 0–39)
BASOPHILS ABSOLUTE: 0.04 E9/L (ref 0–0.2)
BASOPHILS RELATIVE PERCENT: 0.6 % (ref 0–2)
BILIRUB SERPL-MCNC: 0.4 MG/DL (ref 0–1.2)
BUN BLDV-MCNC: 15 MG/DL (ref 6–23)
CALCIUM SERPL-MCNC: 9.4 MG/DL (ref 8.6–10.2)
CHLORIDE BLD-SCNC: 103 MMOL/L (ref 98–107)
CHOLESTEROL, TOTAL: 180 MG/DL (ref 0–199)
CO2: 24 MMOL/L (ref 22–29)
CREAT SERPL-MCNC: 1.2 MG/DL (ref 0.7–1.2)
EOSINOPHILS ABSOLUTE: 0.11 E9/L (ref 0.05–0.5)
EOSINOPHILS RELATIVE PERCENT: 1.7 % (ref 0–6)
GFR AFRICAN AMERICAN: >60
GFR NON-AFRICAN AMERICAN: >60 ML/MIN/1.73
GLUCOSE BLD-MCNC: 113 MG/DL (ref 74–99)
HBA1C MFR BLD: 5.3 % (ref 4–5.6)
HCT VFR BLD CALC: 49.4 % (ref 37–54)
HDLC SERPL-MCNC: 59 MG/DL
HEMOGLOBIN: 16 G/DL (ref 12.5–16.5)
IMMATURE GRANULOCYTES #: 0.02 E9/L
IMMATURE GRANULOCYTES %: 0.3 % (ref 0–5)
LDL CHOLESTEROL CALCULATED: 104 MG/DL (ref 0–99)
LYMPHOCYTES ABSOLUTE: 1.29 E9/L (ref 1.5–4)
LYMPHOCYTES RELATIVE PERCENT: 20 % (ref 20–42)
MCH RBC QN AUTO: 33.5 PG (ref 26–35)
MCHC RBC AUTO-ENTMCNC: 32.4 % (ref 32–34.5)
MCV RBC AUTO: 103.3 FL (ref 80–99.9)
MONOCYTES ABSOLUTE: 0.59 E9/L (ref 0.1–0.95)
MONOCYTES RELATIVE PERCENT: 9.2 % (ref 2–12)
NEUTROPHILS ABSOLUTE: 4.39 E9/L (ref 1.8–7.3)
NEUTROPHILS RELATIVE PERCENT: 68.2 % (ref 43–80)
PDW BLD-RTO: 13.4 FL (ref 11.5–15)
PLATELET # BLD: 326 E9/L (ref 130–450)
PMV BLD AUTO: 9.3 FL (ref 7–12)
POTASSIUM SERPL-SCNC: 4.7 MMOL/L (ref 3.5–5)
RBC # BLD: 4.78 E12/L (ref 3.8–5.8)
SODIUM BLD-SCNC: 142 MMOL/L (ref 132–146)
TOTAL PROTEIN: 7.3 G/DL (ref 6.4–8.3)
TRIGL SERPL-MCNC: 83 MG/DL (ref 0–149)
VLDLC SERPL CALC-MCNC: 17 MG/DL
WBC # BLD: 6.4 E9/L (ref 4.5–11.5)

## 2021-07-19 ENCOUNTER — TELEPHONE (OUTPATIENT)
Dept: FAMILY MEDICINE CLINIC | Age: 66
End: 2021-07-19

## 2021-07-19 DIAGNOSIS — M25.552 LEFT HIP PAIN: Primary | ICD-10-CM

## 2021-07-19 NOTE — TELEPHONE ENCOUNTER
Patient called for referral to Dr. Kumar Jaimes for L hip pain. He has seen her in the past but its been about 2 years ago.       Last seen 6/14/2021  Next appt Visit date not found

## 2021-08-17 ENCOUNTER — OFFICE VISIT (OUTPATIENT)
Dept: PHYSICAL MEDICINE AND REHAB | Age: 66
End: 2021-08-17
Payer: MEDICARE

## 2021-08-17 VITALS
HEIGHT: 69 IN | BODY MASS INDEX: 24.88 KG/M2 | SYSTOLIC BLOOD PRESSURE: 124 MMHG | WEIGHT: 168 LBS | DIASTOLIC BLOOD PRESSURE: 72 MMHG

## 2021-08-17 DIAGNOSIS — M70.62 GREATER TROCHANTERIC BURSITIS OF LEFT HIP: Primary | ICD-10-CM

## 2021-08-17 PROCEDURE — 3017F COLORECTAL CA SCREEN DOC REV: CPT | Performed by: PHYSICAL MEDICINE & REHABILITATION

## 2021-08-17 PROCEDURE — 1123F ACP DISCUSS/DSCN MKR DOCD: CPT | Performed by: PHYSICAL MEDICINE & REHABILITATION

## 2021-08-17 PROCEDURE — 4040F PNEUMOC VAC/ADMIN/RCVD: CPT | Performed by: PHYSICAL MEDICINE & REHABILITATION

## 2021-08-17 PROCEDURE — G8427 DOCREV CUR MEDS BY ELIG CLIN: HCPCS | Performed by: PHYSICAL MEDICINE & REHABILITATION

## 2021-08-17 PROCEDURE — 1036F TOBACCO NON-USER: CPT | Performed by: PHYSICAL MEDICINE & REHABILITATION

## 2021-08-17 PROCEDURE — G8420 CALC BMI NORM PARAMETERS: HCPCS | Performed by: PHYSICAL MEDICINE & REHABILITATION

## 2021-08-17 PROCEDURE — 99213 OFFICE O/P EST LOW 20 MIN: CPT | Performed by: PHYSICAL MEDICINE & REHABILITATION

## 2021-08-17 NOTE — PROGRESS NOTES
Yana Godinez, [id number] [facility] Physical Medicine and Rehabilitation  [address] [facility] [place]  Phone: [phone]  Fax: [phone]    PCP: Ethan Flynn DO  Date of visit: 8/17/21    Chief Complaint   Patient presents with    Hip Pain     New patient        Dear Dr. Vicente Sainz,     Thank you for referring your patient to be seen. As you know,  Philip Villalba is a 73 y.o. male with past medical history as below who presents with 1 week history of left hip pain. He had pain that started after waking up. It was located lateral hip over GT. He has had bursitis in the past. He used ice and heat and the pain gradually subsided. Currently he has no issues. He reports a dull ache in low back and hip but this is normal and something he deals with. He was able to ride home on a motorcycle from [address] [place] [address] yesterday without any issues. He does home exercises twice a day. The pain is rated Pain Score:   2. No numbness/tingling. No weakness. Allergies   Allergen Reactions    Penicillins        Current Outpatient Medications   Medication Sig Dispense Refill    finasteride (PROSCAR) 5 MG tablet       albuterol sulfate HFA (PROAIR HFA) 108 (90 Base) MCG/ACT inhaler Inhale 2 puffs into the lungs every 6 hours as needed for Wheezing 1 Inhaler 1    tamsulosin (FLOMAX) 0.4 MG capsule Take 0.4 mg by mouth daily       Multiple Vitamins-Minerals (CENTRUM SILVER ADULT 50+ PO) Take by mouth      Vitamins-Lipotropics (LIPO-FLAVONOID PLUS PO) two with each meal      tadalafil (CIALIS) 5 MG tablet TAKE 1 TABLET BY MOUTH AS NEEDED (FOR ERECTILE DYSFUNCTION) (Patient not taking: Reported on 8/17/2021)       No current facility-administered medications for this visit.        Past Medical History:   Diagnosis Date    Asthma without status asthmaticus 1/15/2010    DDD (degenerative disc disease), lumbar 11/5/2018    Intraepithelial squamous cell carcinoma 12/26/2019    Meniere disease     [facility]    Pure hypercholesterolemia 1/15/2010       Past Surgical History:   Procedure Laterality Date    EPIDURAL STEROID INJECTION N/A 5/2/2019    L4-5 INTERLAMINAR EPIDURAL STEROID INJECTION performed by Yuri Matthews DO at [place] [address] N/A 05/02/2019    l4-5 INTERLAMINAR EPIDURAL STEROID INJECTION    TONSILLECTOMY      childhood       Family History   Problem Relation Age of Onset    Prostate Cancer Brother     Heart Disease Mother     Other Mother         GI bleed    Other Father         accident        Social History     Tobacco Use    Smoking status: Never Smoker    Smokeless tobacco: Never Used   Vaping Use    Vaping Use: Never used   Substance Use Topics    Alcohol use: No    Drug use: No          Functional Status: The patient is able to ambulate and perform activities of daily living without the use of an assistive device. Occupation: The patient is currently retired. ROS:    Constitutional: Denies fevers, chills, night sweats, unintentional weight loss     Skin: Denies rash or skin changes     Eyes: Denies vision changes    Ears/Nose/Throat: Denies nasal congestion or sore throat     Respiratory: Denies SOB or cough     Cardiovascular: Denies CP, palpitations, edema      Gastrointestinal: Denies abdominal pain,  N/V, constipation, or diarrhea    Genitourinary: Denies urinary symptoms    Neurologic: See HPI.     MSK: See HPI. Psychiatric: Denies sleep disturbance, anxiety, depression    Hematologic/Lymphatic/Immunologic: Denies bruising       Physical Exam:   Blood pressure 124/72, height 5' 9\" (1.753 m), weight 168 lb (76.2 kg). General: well developed and well nourished in no acute distress. Body habitus is normal,. HEENT: No rhinorrhea, sneezing, yawning, or lacrimation. No scleral icterus or conjunctival injection. Resp: symmetrical chest expansion, unlabored breathing, respirations unlabored.    CV: Heart rate is regular. Peripheral pulses are palpable  Lymph: No visible regional lymphadenopathy. Skin: No rashes or ecchymosis. Normal turgor. Psych: Mood is calm. Affect is normal.   Ext: No edema noted     MSK:   Back/Hip Exam:   Inspection: Pelvis was symmetric. Lumbar lordotic curvature was decreased. There was no scoliosis. No ecchymoses or erythema. Palpation: Palpatory exam revealed no tenderness along lumbosacral paraspinals, midline spine, SI joint sulcus, greater trochanters and TFL on both side. Neurological Exam:  Strength:   R  L  Hip Flex  5  5  Knee Ext  5  5  Ankle dorsi  5  5  EHL   5 5  Ankle Plantar  5  5    Reflexes:   R  L  Patellar  (2+) (2+)  Ankle Jerk  (2+) (2+)    Gait is normal.         Impression:   Obi Chino is a 73 y.o. male    1. Greater trochanteric bursitis of left hip        Plan:   · Had flare of bursitis that improved within a week. · Discussed to continue HEP. If it flares again, ice and voltaren gel. · No need for further work up or treatment at this time. If pain returns and does not improve over 4 weeks, then follow up.  The patient was educated about the diagnosis, prognosis, indications, risks and benefits of treatment. An opportunity to ask questions was given to the patient and questions were answered. The patient agreed to proceed with the recommended treatment as described above.      Susana Pollock DO, [facility]   Board Certified Physical Medicine and Rehabilitation

## 2021-08-18 ENCOUNTER — TELEPHONE (OUTPATIENT)
Dept: PHYSICAL MEDICINE AND REHAB | Age: 66
End: 2021-08-18

## 2021-08-18 NOTE — TELEPHONE ENCOUNTER
Called and spoke with patient, informed him of provider message This advisory is there because it has not been studied on the hip. It is safe. If he has any reactions then stop. Patient verbalized an understanding.

## 2021-08-18 NOTE — TELEPHONE ENCOUNTER
Patient called in stating he picked up valtaren gel 1% as advised from his visit but states it should not be used on his hip which is where he is having the pain. Also states it should not be used with asthma and he has had in the past.  Patient would like some advise on what to do. Please advise.

## 2021-08-18 NOTE — TELEPHONE ENCOUNTER
This advisory is there because it has not been studied on the hip. It is safe. If he has any reactions then stop.

## 2021-10-15 ENCOUNTER — TELEPHONE (OUTPATIENT)
Dept: FAMILY MEDICINE CLINIC | Age: 66
End: 2021-10-15

## 2021-10-15 ENCOUNTER — OFFICE VISIT (OUTPATIENT)
Dept: FAMILY MEDICINE CLINIC | Age: 66
End: 2021-10-15
Payer: MEDICARE

## 2021-10-15 VITALS
HEART RATE: 59 BPM | DIASTOLIC BLOOD PRESSURE: 86 MMHG | WEIGHT: 168 LBS | SYSTOLIC BLOOD PRESSURE: 139 MMHG | OXYGEN SATURATION: 99 % | BODY MASS INDEX: 24.88 KG/M2 | RESPIRATION RATE: 17 BRPM | TEMPERATURE: 97.2 F | HEIGHT: 69 IN

## 2021-10-15 DIAGNOSIS — R42 DIZZINESS: Primary | ICD-10-CM

## 2021-10-15 DIAGNOSIS — J06.9 VIRAL URI: ICD-10-CM

## 2021-10-15 DIAGNOSIS — H81.09 MENIERE'S DISEASE, UNSPECIFIED LATERALITY: ICD-10-CM

## 2021-10-15 PROCEDURE — 3017F COLORECTAL CA SCREEN DOC REV: CPT | Performed by: NURSE PRACTITIONER

## 2021-10-15 PROCEDURE — G8484 FLU IMMUNIZE NO ADMIN: HCPCS | Performed by: NURSE PRACTITIONER

## 2021-10-15 PROCEDURE — G8420 CALC BMI NORM PARAMETERS: HCPCS | Performed by: NURSE PRACTITIONER

## 2021-10-15 PROCEDURE — 1123F ACP DISCUSS/DSCN MKR DOCD: CPT | Performed by: NURSE PRACTITIONER

## 2021-10-15 PROCEDURE — G8427 DOCREV CUR MEDS BY ELIG CLIN: HCPCS | Performed by: NURSE PRACTITIONER

## 2021-10-15 PROCEDURE — 99214 OFFICE O/P EST MOD 30 MIN: CPT | Performed by: NURSE PRACTITIONER

## 2021-10-15 PROCEDURE — 4040F PNEUMOC VAC/ADMIN/RCVD: CPT | Performed by: NURSE PRACTITIONER

## 2021-10-15 PROCEDURE — 1036F TOBACCO NON-USER: CPT | Performed by: NURSE PRACTITIONER

## 2021-10-15 RX ORDER — MECLIZINE HCL 12.5 MG/1
12.5 TABLET ORAL 2 TIMES DAILY
Qty: 20 TABLET | Refills: 0 | Status: SHIPPED | OUTPATIENT
Start: 2021-10-15 | End: 2021-10-25

## 2021-10-15 RX ORDER — AZITHROMYCIN 250 MG/1
250 TABLET, FILM COATED ORAL SEE ADMIN INSTRUCTIONS
Qty: 6 TABLET | Refills: 0 | Status: SHIPPED | OUTPATIENT
Start: 2021-10-15 | End: 2021-10-20

## 2021-10-15 SDOH — ECONOMIC STABILITY: FOOD INSECURITY: WITHIN THE PAST 12 MONTHS, YOU WORRIED THAT YOUR FOOD WOULD RUN OUT BEFORE YOU GOT MONEY TO BUY MORE.: NEVER TRUE

## 2021-10-15 SDOH — ECONOMIC STABILITY: FOOD INSECURITY: WITHIN THE PAST 12 MONTHS, THE FOOD YOU BOUGHT JUST DIDN'T LAST AND YOU DIDN'T HAVE MONEY TO GET MORE.: NEVER TRUE

## 2021-10-15 ASSESSMENT — SOCIAL DETERMINANTS OF HEALTH (SDOH): HOW HARD IS IT FOR YOU TO PAY FOR THE VERY BASICS LIKE FOOD, HOUSING, MEDICAL CARE, AND HEATING?: NOT HARD AT ALL

## 2021-10-15 NOTE — TELEPHONE ENCOUNTER
Called and spoke to pt's wife. Advised her to have pt come to walk in clinic. Wife verbalized understanding.

## 2021-10-15 NOTE — TELEPHONE ENCOUNTER
----- Message from [address] [facility] sent at 10/15/2021  9:32 AM EDT -----  Subject: Appointment Request    Reason for Call: Ear Problem    QUESTIONS  Type of Appointment? Established Patient  Reason for appointment request? No appointments available during search  Additional Information for Provider? Pt c/o ear ache and dizziness this am   10/15/2021, wife thinks its and ear infection  ---------------------------------------------------------------------------  --------------  CALL BACK INFO  What is the best way for the office to contact you? OK to leave message on   voicemail  Preferred Call Back Phone Number? [phone]  ---------------------------------------------------------------------------  --------------  SCRIPT ANSWERS  Relationship to Patient? Other  Representative Name? Zaira Betancourt  Additional information verified (besides Name and Date of Birth)? Phone   Number  Did you have an injury or trauma within the past three days? No  Is your pain affecting your daily activities? Yes  Have you been diagnosed with, awaiting test results for, or told that you   are suspected of having COVID-19 (Coronavirus)? (If patient has tested   negative or was tested as a requirement for work, school, or travel and   not based on symptoms, answer no)? No  Within the past two weeks have you developed any of the following symptoms   (answer no if symptoms have been present longer than 2 weeks or began   more than 2 weeks ago)? Fever or Chills, Cough, Shortness of breath or   difficulty breathing, Loss of taste or smell, Sore throat, Nasal   congestion, Sneezing or runny nose, Fatigue or generalized body aches   (answer no if pain is specific to a body part e.g. back pain), Diarrhea,   Headache? No  Have you had close contact with someone with COVID-19 in the last 14 days? No  (Service Expert - click yes below to proceed with [facility] As Usual   Scheduling)?  Yes

## 2021-10-15 NOTE — PROGRESS NOTES
Chief Complaint   Dizziness (Has meniere's  disease but typically episodes only last 45 min this has been going on all morning. Hears ringing in right ear )    History of Present Illness   Source of history provided by:  patient and spouse/SO. Maudie Hammans is a 73 y.o. old male presenting to the walk in clinic for evaluation of dizziness which began days ago. Since recognized, the symptoms have been progressed. Pt has had these symptoms before. It is not positional. Denies any visual changes. Pt denies any recent falls, syncope, CP, SOB, palpitations, HA, visual loss, unilateral weakness, fever, neck stiffness, or recent illness. Has followed with ENT in the past, wearing bilateral hearing aides. Reports change in medication 2 weeks ago, has since backed off to original dosage. ROS    Unless otherwise stated in this report or unable to obtain because of the patient's clinical or mental status as evidenced by the medical record, this patients's positive and negative responses for Review of Systems, constitutional, psych, eyes, ENT, cardiovascular, respiratory, gastrointestinal, neurological, genitourinary, musculoskeletal, integument systems and systems related to the presenting problem are either stated in the preceding or were not pertinent or were negative for the symptoms and/or complaints related to the medical problem. Past Medical History:  has a past medical history of Asthma without status asthmaticus, DDD (degenerative disc disease), lumbar, Intraepithelial squamous cell carcinoma, Meniere disease, [facility] ([facility] and Pure hypercholesterolemia. Past Surgical History:  has a past surgical history that includes Tonsillectomy; other surgical history (N/A, 05/02/2019); and epidural steroid injection (N/A, 5/2/2019). Social History:  reports that he has never smoked. He has never used smokeless tobacco. He reports that he does not drink alcohol and does not use drugs.   Family History: family history includes Heart Disease in his mother; Other in his father and mother; Prostate Cancer in his brother. Allergies: Penicillins    Physical Exam         VS:  /86 (Site: Left Upper Arm, Position: Sitting, Cuff Size: Medium Adult)   Pulse 59   Temp 97.2 °F (36.2 °C) (Temporal)   Resp 17   Ht 5' 9\" (1.753 m)   Wt 168 lb (76.2 kg)   SpO2 99%   BMI 24.81 kg/m²    Oxygen Saturation Interpretation: Normal.    Constitutional:  Level of Consciousness: Alert, gives appropriate history, ambulated self to the room. Eyes:  PERRL, EOMI, no discharge or conjunctival injection. Ears:  External ears without lesions. TM's clear bilaterally. Throat:  Pharynx without injection, exudate, or tonsillar hypertrophy. Airway patient. Neck:  Normal ROM. Supple. Lungs:  Clear to auscultation and breath sounds equal.  Heart:  Regular rate and rhythm, normal heart sounds, without pathological murmurs, ectopy, gallops, or rubs. Abdomen:  Soft, nontender, good bowel sounds. No firm or pulsatile mass. Back:  No costovertebral tenderness. Skin:  Normal turgor. Warm, dry, without visible rash, unless noted elsewhere. Neurological:  Oriented. Motor functions intact. CN II-XII intact. No nystagmus noted. Ambulates without ataxia. UE/LE/ strength 5/5 bilaterally. Lab / Imaging Results   (All laboratory and radiology results have been personally reviewed by myself)  Labs:  No results found for this visit on 10/15/21. Imaging: All Radiology results interpreted by Radiologist unless otherwise noted. Assessment / Plan     Impression(s):  Luanne Lawson was seen today for dizziness. Diagnoses and all orders for this visit:    Dizziness/Viral URI/Meniere's Disease  -     meclizine (ANTIVERT) 12.5 MG tablet; Take 1 tablet by mouth 2 times daily for 10 days  -     azithromycin (ZITHROMAX) 250 MG tablet;  Take 1 tablet by mouth See Admin Instructions for 5 days 500mg on day 1 followed by 250mg on days 2 - 5  - F/u with speciality if symptoms persist    Disposition:  Disposition: Discharge to home . Vitals reviewed which are stable. Neuro exam is benign. Symptoms most consistent with BPPV. Script written, side effects discussed. Pt was also provided with a handout on how to perform home Epley manuevers. Advise f/u with PCP in 3-5 days for recheck if symptoms persist. ED sooner if symptoms worsen or change. ED immediately with severe/worsening vertigo, vomiting, severe HA, vomiting, fever, neck stiffness, unilateral weakness, or paresthesias. Pt states understanding and is in agreement with this care plan. All questions answered. Elías Pop, APRN - CNP    **This report was transcribed using voice recognition software. Every effort was made to ensure accuracy; however, inadvertent computerized transcription errors may be present.

## 2022-01-18 ENCOUNTER — HOSPITAL ENCOUNTER (EMERGENCY)
Age: 67
Discharge: HOME | End: 2022-01-18
Attending: EMERGENCY MEDICINE
Payer: MEDICARE

## 2022-01-18 ENCOUNTER — TELEPHONE (OUTPATIENT)
Dept: FAMILY MEDICINE CLINIC | Age: 67
End: 2022-01-18

## 2022-01-18 ENCOUNTER — APPOINTMENT (OUTPATIENT)
Dept: GENERAL RADIOLOGY | Age: 67
End: 2022-01-18
Payer: MEDICARE

## 2022-01-18 VITALS
SYSTOLIC BLOOD PRESSURE: 136 MMHG | TEMPERATURE: 97 F | RESPIRATION RATE: 12 BRPM | HEART RATE: 68 BPM | OXYGEN SATURATION: 98 % | DIASTOLIC BLOOD PRESSURE: 86 MMHG

## 2022-01-18 DIAGNOSIS — R00.2 PALPITATIONS: ICD-10-CM

## 2022-01-18 DIAGNOSIS — R07.9 CHEST PAIN, UNSPECIFIED TYPE: Primary | ICD-10-CM

## 2022-01-18 LAB
ALBUMIN SERPL-MCNC: 4.6 G/DL (ref 3.5–5.2)
ALP BLD-CCNC: 72 U/L (ref 40–129)
ALT SERPL-CCNC: 25 U/L (ref 0–40)
ANION GAP SERPL CALCULATED.3IONS-SCNC: 9 MMOL/L (ref 7–16)
APTT: 30.8 SEC (ref 24.5–35.1)
AST SERPL-CCNC: 34 U/L (ref 0–39)
BASOPHILS ABSOLUTE: 0.06 E9/L (ref 0–0.2)
BASOPHILS RELATIVE PERCENT: 0.8 % (ref 0–2)
BILIRUB SERPL-MCNC: 0.6 MG/DL (ref 0–1.2)
BUN BLDV-MCNC: 16 MG/DL (ref 6–23)
CALCIUM SERPL-MCNC: 9.7 MG/DL (ref 8.6–10.2)
CHLORIDE BLD-SCNC: 103 MMOL/L (ref 98–107)
CO2: 27 MMOL/L (ref 22–29)
CREAT SERPL-MCNC: 1.2 MG/DL (ref 0.7–1.2)
EOSINOPHILS ABSOLUTE: 0.05 E9/L (ref 0.05–0.5)
EOSINOPHILS RELATIVE PERCENT: 0.7 % (ref 0–6)
GFR AFRICAN AMERICAN: >60
GFR NON-AFRICAN AMERICAN: >60 ML/MIN/1.73
GLUCOSE BLD-MCNC: 92 MG/DL (ref 74–99)
HCT VFR BLD CALC: 50.4 % (ref 37–54)
HEMOGLOBIN: 16.8 G/DL (ref 12.5–16.5)
IMMATURE GRANULOCYTES #: 0.02 E9/L
IMMATURE GRANULOCYTES %: 0.3 % (ref 0–5)
INR BLD: 1
LYMPHOCYTES ABSOLUTE: 1.29 E9/L (ref 1.5–4)
LYMPHOCYTES RELATIVE PERCENT: 17.9 % (ref 20–42)
MAGNESIUM: 2.5 MG/DL (ref 1.6–2.6)
MCH RBC QN AUTO: 33.7 PG (ref 26–35)
MCHC RBC AUTO-ENTMCNC: 33.3 % (ref 32–34.5)
MCV RBC AUTO: 101 FL (ref 80–99.9)
MONOCYTES ABSOLUTE: 0.76 E9/L (ref 0.1–0.95)
MONOCYTES RELATIVE PERCENT: 10.5 % (ref 2–12)
NEUTROPHILS ABSOLUTE: 5.04 E9/L (ref 1.8–7.3)
NEUTROPHILS RELATIVE PERCENT: 69.8 % (ref 43–80)
PDW BLD-RTO: 12.8 FL (ref 11.5–15)
PLATELET # BLD: 314 E9/L (ref 130–450)
PMV BLD AUTO: 8.5 FL (ref 7–12)
POTASSIUM SERPL-SCNC: 4.8 MMOL/L (ref 3.5–5)
PROTHROMBIN TIME: 11.8 SEC (ref 9.3–12.4)
RBC # BLD: 4.99 E12/L (ref 3.8–5.8)
SODIUM BLD-SCNC: 139 MMOL/L (ref 132–146)
TOTAL PROTEIN: 7.4 G/DL (ref 6.4–8.3)
TROPONIN, HIGH SENSITIVITY: 11 NG/L (ref 0–11)
TROPONIN, HIGH SENSITIVITY: 12 NG/L (ref 0–11)
WBC # BLD: 7.2 E9/L (ref 4.5–11.5)

## 2022-01-18 PROCEDURE — 80053 COMPREHEN METABOLIC PANEL: CPT

## 2022-01-18 PROCEDURE — 2580000003 HC RX 258: Performed by: EMERGENCY MEDICINE

## 2022-01-18 PROCEDURE — 99284 EMERGENCY DEPT VISIT MOD MDM: CPT

## 2022-01-18 PROCEDURE — 85610 PROTHROMBIN TIME: CPT

## 2022-01-18 PROCEDURE — 6370000000 HC RX 637 (ALT 250 FOR IP): Performed by: EMERGENCY MEDICINE

## 2022-01-18 PROCEDURE — 93005 ELECTROCARDIOGRAM TRACING: CPT | Performed by: EMERGENCY MEDICINE

## 2022-01-18 PROCEDURE — 85025 COMPLETE CBC W/AUTO DIFF WBC: CPT

## 2022-01-18 PROCEDURE — 83735 ASSAY OF MAGNESIUM: CPT

## 2022-01-18 PROCEDURE — 85730 THROMBOPLASTIN TIME PARTIAL: CPT

## 2022-01-18 PROCEDURE — 71046 X-RAY EXAM CHEST 2 VIEWS: CPT

## 2022-01-18 PROCEDURE — 84484 ASSAY OF TROPONIN QUANT: CPT

## 2022-01-18 RX ORDER — ASPIRIN 81 MG/1
324 TABLET, CHEWABLE ORAL ONCE
Status: COMPLETED | OUTPATIENT
Start: 2022-01-18 | End: 2022-01-18

## 2022-01-18 RX ORDER — 0.9 % SODIUM CHLORIDE 0.9 %
1000 INTRAVENOUS SOLUTION INTRAVENOUS ONCE
Status: COMPLETED | OUTPATIENT
Start: 2022-01-18 | End: 2022-01-18

## 2022-01-18 RX ADMIN — ASPIRIN 81 MG CHEWABLE TABLET 324 MG: 81 TABLET CHEWABLE at 12:13

## 2022-01-18 RX ADMIN — SODIUM CHLORIDE 1000 ML: 9 INJECTION, SOLUTION INTRAVENOUS at 12:30

## 2022-01-18 ASSESSMENT — ENCOUNTER SYMPTOMS
SHORTNESS OF BREATH: 0
BACK PAIN: 0
NAUSEA: 0
VOMITING: 0
WHEEZING: 0
SORE THROAT: 0
CHEST TIGHTNESS: 1
ABDOMINAL PAIN: 0
DIARRHEA: 0
COUGH: 0

## 2022-01-18 ASSESSMENT — PAIN SCALES - GENERAL
PAINLEVEL_OUTOF10: 4
PAINLEVEL_OUTOF10: 0

## 2022-01-18 ASSESSMENT — PAIN DESCRIPTION - LOCATION: LOCATION: CHEST

## 2022-01-18 ASSESSMENT — PAIN DESCRIPTION - DESCRIPTORS: DESCRIPTORS: TIGHTNESS

## 2022-01-18 NOTE — ED PROVIDER NOTES
Chief complaint:  Chest pain    HPI history provided by the patient  Patient comes in complaining of episodes of a fluttering sensation in the heart with some general anterior chest tightness since yesterday when out working and shoveling snow. He states he typically rides a bicycle several miles a day and has no difficulty. No shortness of breath. No nausea vomiting or diarrhea. No abdominal pain. No migration or radiation of pain or symptoms. Describes the pain as more of a tightness feeling across the chest and has pretty much been constant since yesterday but does feel little worse when he feels his palpitations or fluttering. During the exam he does state he has a fluttering for a brief moment and on the monitor has a PVC. No recent traveling or surgeries. No leg pain or swelling. No history of blood clots. No treatment prior to arrival.  Nothing makes it better, he does state that eating seems to make it a little worse at times. Review of Systems   Constitutional: Negative for chills, diaphoresis, fatigue and fever. HENT: Negative for congestion and sore throat. Respiratory: Positive for chest tightness. Negative for cough, shortness of breath and wheezing. Cardiovascular: Positive for palpitations. Negative for chest pain and leg swelling. Gastrointestinal: Negative for abdominal pain, diarrhea, nausea and vomiting. Genitourinary: Negative for dysuria, flank pain, frequency, hematuria and urgency. Musculoskeletal: Negative for arthralgias, back pain, gait problem, joint swelling, myalgias, neck pain and neck stiffness. Skin: Negative for rash and wound. Neurological: Negative for dizziness, seizures, syncope, weakness, light-headedness, numbness and headaches. Hematological: Negative for adenopathy. All other systems reviewed and are negative. Physical Exam  Vitals and nursing note reviewed. Constitutional:       General: He is not in acute distress.      Appearance: He is well-developed. He is not ill-appearing, toxic-appearing or diaphoretic. HENT:      Head: Normocephalic and atraumatic. Eyes:      General: No scleral icterus. Pupils: Pupils are equal, round, and reactive to light. Neck:      Vascular: No JVD. Trachea: Trachea normal. No tracheal tenderness. Cardiovascular:      Rate and Rhythm: Normal rate and regular rhythm. Heart sounds: Normal heart sounds. No murmur heard. Pulmonary:      Effort: Pulmonary effort is normal. No respiratory distress. Breath sounds: Normal breath sounds. No stridor, decreased air movement or transmitted upper airway sounds. No decreased breath sounds, wheezing, rhonchi or rales. Chest:      Chest wall: No tenderness. Abdominal:      General: Bowel sounds are normal. There is no distension. Palpations: Abdomen is soft. Tenderness: There is no abdominal tenderness. There is no right CVA tenderness, left CVA tenderness, guarding or rebound. Musculoskeletal:         General: No swelling, tenderness, deformity or signs of injury. Cervical back: Normal range of motion and neck supple. No signs of trauma or rigidity. No pain with movement, spinous process tenderness or muscular tenderness. Normal range of motion. Right lower leg: No edema. Left lower leg: No edema. Comments: Arms legs are neurovascular intact. No pretibial edema or calf pain. Lymphadenopathy:      Cervical: No cervical adenopathy. Skin:     General: Skin is warm and dry. Coloration: Skin is not cyanotic, jaundiced, mottled or pale. Findings: No erythema or rash. Neurological:      General: No focal deficit present. Mental Status: He is alert and oriented to person, place, and time. GCS: GCS eye subscore is 4. GCS verbal subscore is 5. GCS motor subscore is 6. Cranial Nerves: Cranial nerves are intact. No cranial nerve deficit. Sensory: Sensation is intact.       Motor: Motor function is intact. Coordination: Coordination is intact. Coordination normal.          Procedures     [facility]     ED Course as of 01/18/22 1441   Tue Jan 18, 2022   1424 Patient sitting the bed resting comfortably no distress. Now describes a chronic history of feeling his heartbeat and flutter, he states he only notices it when he is laying down at night to go to sleep and if he is active it does not bother him. He states he did plowing and tractor work yesterday and was fine and after sitting down and relaxing he felt a flutter and his heart. He denies other chest pain or having had other chest pain at this time and describes everything is more of a flutter discomfort from the flutter sensation. Currently feels fine and is resting in no distress. I do updated them on work-up results and probable outpatient follow-up for further evaluation including possible Holter monitors or stress test.  We will repeat his troponin and EKG first. [NC]      ED Course User Index  [NC] Susu Dawson DO     EKG Interpretation    Interpreted by emergency department physician    Rhythm: normal sinus   Rate: 79  Axis: normal  Ectopy: none  Conduction: normal  ST Segments: no acute change  T Waves: no acute change  Q Waves: none    Clinical Impression: no acute changes    Susu Dawson DO       EKG Interpretation #2    Interpreted by emergency department physician    Rhythm: normal sinus   Rate: 68  Axis: normal  Ectopy: none  Conduction: normal  ST Segments: no acute change  T Waves: no acute change  Q Waves: none    Clinical Impression: no acute changes    Susu Dawson DO      ED Course as of 01/18/22 1528   Tue Jan 18, 2022   1424 Patient sitting the bed resting comfortably no distress. Now describes a chronic history of feeling his heartbeat and flutter, he states he only notices it when he is laying down at night to go to sleep and if he is active it does not bother him.   He states he did plowing and tractor work yesterday and was fine and after sitting down and relaxing he felt a flutter and his heart. He denies other chest pain or having had other chest pain at this time and describes everything is more of a flutter discomfort from the flutter sensation. Currently feels fine and is resting in no distress. I do updated them on work-up results and probable outpatient follow-up for further evaluation including possible Holter monitors or stress test.  We will repeat his troponin and EKG first. [NC]      ED Course User Index  [NC] Rm Murphy, DO       --------------------------------------------- PAST HISTORY ---------------------------------------------  Past Medical History:  has a past medical history of Asthma without status asthmaticus, DDD (degenerative disc disease), lumbar, Intraepithelial squamous cell carcinoma, Meniere disease, Prostate cancer ([facility] [address] and Pure hypercholesterolemia. Past Surgical History:  has a past surgical history that includes Tonsillectomy; other surgical history (N/A, 05/02/2019); and epidural steroid injection (N/A, 5/2/2019). Social History:  reports that he has never smoked. He has never used smokeless tobacco. He reports that he does not drink alcohol and does not use drugs. Family History: family history includes Heart Disease in his mother; Other in his father and mother; Prostate Cancer in his brother. The patients home medications have been reviewed.     Allergies: Penicillins    -------------------------------------------------- RESULTS -------------------------------------------------  Labs:  Results for orders placed or performed during the hospital encounter of 01/18/22   CBC Auto Differential   Result Value Ref Range    WBC 7.2 4.5 - 11.5 E9/L    RBC 4.99 3.80 - 5.80 E12/L    Hemoglobin 16.8 (H) 12.5 - 16.5 g/dL    Hematocrit 50.4 37.0 - 54.0 %    .0 (H) 80.0 - 99.9 fL    MCH 33.7 26.0 - 35.0 pg    MCHC 33.3 32.0 - 34.5 %    RDW 12.8 11.5 - 15.0 fL    Platelets 770 540 - 314 E9/L    MPV 8.5 7.0 - 12.0 fL    Neutrophils % 69.8 43.0 - 80.0 %    Immature Granulocytes % 0.3 0.0 - 5.0 %    Lymphocytes % 17.9 (L) 20.0 - 42.0 %    Monocytes % 10.5 2.0 - 12.0 %    Eosinophils % 0.7 0.0 - 6.0 %    Basophils % 0.8 0.0 - 2.0 %    Neutrophils Absolute 5.04 1.80 - 7.30 E9/L    Immature Granulocytes # 0.02 E9/L    Lymphocytes Absolute 1.29 (L) 1.50 - 4.00 E9/L    Monocytes Absolute 0.76 0.10 - 0.95 E9/L    Eosinophils Absolute 0.05 0.05 - 0.50 E9/L    Basophils Absolute 0.06 0.00 - 0.20 E9/L   Comprehensive Metabolic Panel   Result Value Ref Range    Sodium 139 132 - 146 mmol/L    Potassium 4.8 3.5 - 5.0 mmol/L    Chloride 103 98 - 107 mmol/L    CO2 27 22 - 29 mmol/L    Anion Gap 9 7 - 16 mmol/L    Glucose 92 74 - 99 mg/dL    BUN 16 6 - 23 mg/dL    CREATININE 1.2 0.7 - 1.2 mg/dL    GFR Non-African American >60 >=60 mL/min/1.73    GFR African American >60     Calcium 9.7 8.6 - 10.2 mg/dL    Total Protein 7.4 6.4 - 8.3 g/dL    Albumin 4.6 3.5 - 5.2 g/dL    Total Bilirubin 0.6 0.0 - 1.2 mg/dL    Alkaline Phosphatase 72 40 - 129 U/L    ALT 25 0 - 40 U/L    AST 34 0 - 39 U/L   Protime-INR   Result Value Ref Range    Protime 11.8 9.3 - 12.4 sec    INR 1.0    APTT   Result Value Ref Range    aPTT 30.8 24.5 - 35.1 sec   Troponin   Result Value Ref Range    Troponin, High Sensitivity 11 0 - 11 ng/L   Magnesium   Result Value Ref Range    Magnesium 2.5 1.6 - 2.6 mg/dL   Troponin   Result Value Ref Range    Troponin, High Sensitivity 12 (H) 0 - 11 ng/L   EKG 12 Lead   Result Value Ref Range    Ventricular Rate 79 BPM    Atrial Rate 79 BPM    P-R Interval 176 ms    QRS Duration 66 ms    Q-T Interval 320 ms    QTc Calculation (Bazett) 366 ms    P Axis 54 degrees    R Axis -9 degrees    T Axis 13 degrees   EKG 12 Lead   Result Value Ref Range    Ventricular Rate 68 BPM    Atrial Rate 68 BPM    P-R Interval 196 ms    QRS Duration 84 ms    Q-T Interval 374 ms    QTc Calculation

## 2022-01-18 NOTE — ED NOTES
Bed: H5  Expected date:   Expected time:   Means of arrival:   Comments:  Pt in [address] [facility]  01/18/22 6269

## 2022-01-18 NOTE — TELEPHONE ENCOUNTER
Patient called asking for an appt w/Dr. Diana Lemon today, stating he was working outside yesterday, shoveling snow, and it took quite awhile for his heart rate to come down. Patient then stated he was feeling tightness in his chest.  Advised patient to go to ED to be eval.  Patient was agreeable. Msg routed, for informational purposes.     Last seen 6/14/2021  Next appt 6/20/2022

## 2022-01-19 LAB
EKG ATRIAL RATE: 68 BPM
EKG ATRIAL RATE: 79 BPM
EKG P AXIS: 54 DEGREES
EKG P AXIS: 60 DEGREES
EKG P-R INTERVAL: 176 MS
EKG P-R INTERVAL: 196 MS
EKG Q-T INTERVAL: 320 MS
EKG Q-T INTERVAL: 374 MS
EKG QRS DURATION: 66 MS
EKG QRS DURATION: 84 MS
EKG QTC CALCULATION (BAZETT): 366 MS
EKG QTC CALCULATION (BAZETT): 397 MS
EKG R AXIS: -9 DEGREES
EKG R AXIS: 24 DEGREES
EKG T AXIS: 13 DEGREES
EKG T AXIS: 41 DEGREES
EKG VENTRICULAR RATE: 68 BPM
EKG VENTRICULAR RATE: 79 BPM

## 2022-01-24 ENCOUNTER — OFFICE VISIT (OUTPATIENT)
Dept: FAMILY MEDICINE CLINIC | Age: 67
End: 2022-01-24
Payer: MEDICARE

## 2022-01-24 VITALS
TEMPERATURE: 96.8 F | WEIGHT: 177 LBS | SYSTOLIC BLOOD PRESSURE: 126 MMHG | DIASTOLIC BLOOD PRESSURE: 80 MMHG | BODY MASS INDEX: 26.22 KG/M2 | HEART RATE: 83 BPM | RESPIRATION RATE: 16 BRPM | HEIGHT: 69 IN | OXYGEN SATURATION: 98 %

## 2022-01-24 DIAGNOSIS — R00.2 PALPITATIONS: ICD-10-CM

## 2022-01-24 DIAGNOSIS — R42 DIZZINESS: ICD-10-CM

## 2022-01-24 DIAGNOSIS — R07.89 CHEST TIGHTNESS: Primary | ICD-10-CM

## 2022-01-24 PROCEDURE — G8417 CALC BMI ABV UP PARAM F/U: HCPCS | Performed by: FAMILY MEDICINE

## 2022-01-24 PROCEDURE — 1123F ACP DISCUSS/DSCN MKR DOCD: CPT | Performed by: FAMILY MEDICINE

## 2022-01-24 PROCEDURE — 1036F TOBACCO NON-USER: CPT | Performed by: FAMILY MEDICINE

## 2022-01-24 PROCEDURE — 3017F COLORECTAL CA SCREEN DOC REV: CPT | Performed by: FAMILY MEDICINE

## 2022-01-24 PROCEDURE — G8427 DOCREV CUR MEDS BY ELIG CLIN: HCPCS | Performed by: FAMILY MEDICINE

## 2022-01-24 PROCEDURE — G8484 FLU IMMUNIZE NO ADMIN: HCPCS | Performed by: FAMILY MEDICINE

## 2022-01-24 PROCEDURE — 99214 OFFICE O/P EST MOD 30 MIN: CPT | Performed by: FAMILY MEDICINE

## 2022-01-24 PROCEDURE — 4040F PNEUMOC VAC/ADMIN/RCVD: CPT | Performed by: FAMILY MEDICINE

## 2022-01-24 ASSESSMENT — ENCOUNTER SYMPTOMS
SHORTNESS OF BREATH: 0
NAUSEA: 0
ABDOMINAL PAIN: 0
CONSTIPATION: 0
DIARRHEA: 0
VOMITING: 0
COUGH: 0

## 2022-01-24 NOTE — PROGRESS NOTES
Uma Pal (:  1955) is a 73 y.o. male,Established patient, here for evaluation of the following chief complaint(s):  Follow-up (ED follow up 2022. CP after shoveling snow. still feels a flutter in chest )      ASSESSMENT/PLAN:  1. Chest tightness  -     NM Cardiac Stress Test Nuclear Imaging; Future  -     Cardiac Stress Test Exercise- Treadmill; Future  -     Holter Monitor 48 Hour - Clinic Performed; Future  2. Palpitations  -     NM Cardiac Stress Test Nuclear Imaging; Future  -     Cardiac Stress Test Exercise- Treadmill; Future  -     Holter Monitor 48 Hour - Clinic Performed; Future  3. Dizziness  -     US CAROTID ARTERY BILATERAL; Future    As above. Advised on signs or symptoms to call or seek immediate care. We will send for cardiac testing given significant risk factors and recent symptoms    Return in about 4 weeks (around 2022). SUBJECTIVE/OBJECTIVE:  HPI  ED 6 days prior with chest tightness, fluttering sensation following snow shoveling    CXR:  FINDINGS:   The lungs are without acute focal process.  There is no effusion or   pneumothorax. The cardiomediastinal silhouette is without acute process. The   osseous structures are without acute process.           Impression   No acute process. Labs unremarkable    EKG NSR, no acute changes    Today, he reports improvement of symptoms    Endorses longstanding history of palpitations usually worse at rest    He is generally active and has not had prior issues with similar chest pressure    He reports some dizziness which he previously attributed to Meniere's disease    He previously underwent stress testing years prior as part of evaluation prior to prostate surgery.       The 10-year ASCVD risk score (Harish Beavers, et al., 2013) is: 10.5%    Values used to calculate the score:      Age: 73 years      Sex: Male      Is Non- : No      Diabetic: No      Tobacco smoker: No      Systolic Blood Pressure: 126 mmHg      Is BP treated: No      HDL Cholesterol: 59 mg/dL      Total Cholesterol: 180 mg/dL    Review of Systems   Constitutional: Negative for chills and fever. Respiratory: Negative for cough and shortness of breath. Cardiovascular: Positive for chest pain (intermittent, described as a pressure sensation) and palpitations. Negative for leg swelling. Gastrointestinal: Negative for abdominal pain, constipation, diarrhea, nausea and vomiting. Genitourinary: Negative for dysuria. Neurological: Positive for dizziness. Negative for headaches. Vitals:    01/24/22 1327   BP: 126/80   Pulse: 83   Resp: 16   Temp: 96.8 °F (36 °C)   TempSrc: Temporal   SpO2: 98%   Weight: 177 lb (80.3 kg)   Height: 5' 9\" (1.753 m)     Estimated body mass index is 26.14 kg/m² as calculated from the following:    Height as of this encounter: 5' 9\" (1.753 m). Weight as of this encounter: 177 lb (80.3 kg). Physical Exam  Constitutional:       General: He is not in acute distress. Appearance: He is well-developed. HENT:      Head: Normocephalic and atraumatic. Eyes:      Extraocular Movements: Extraocular movements intact. Conjunctiva/sclera: Conjunctivae normal.   Cardiovascular:      Rate and Rhythm: Normal rate and regular rhythm. Pulmonary:      Effort: Pulmonary effort is normal. No respiratory distress. Breath sounds: Normal breath sounds. No wheezing, rhonchi or rales. Abdominal:      General: There is no distension. Musculoskeletal:      Right lower leg: No edema. Left lower leg: No edema. Neurological:      General: No focal deficit present. Mental Status: He is alert. Mental status is at baseline. Prior to Visit Medications    Medication Sig Taking?  Authorizing Provider   finasteride (PROSCAR) 5 MG tablet  Yes Historical Provider, MD   tamsulosin (FLOMAX) 0.4 MG capsule Take 0.4 mg by mouth daily  Yes Historical Provider, MD   tadalafil (CIALIS) 5 MG tablet TAKE 1 TABLET BY MOUTH AS NEEDED (FOR ERECTILE DYSFUNCTION) Yes Historical Provider, MD   Multiple Vitamins-Minerals (CENTRUM SILVER ADULT 50+ PO) Take by mouth Yes Historical Provider, MD   Vitamins-Lipotropics (LIPO-FLAVONOID PLUS PO) two with each meal Yes Historical Provider, MD   albuterol sulfate HFA (PROAIR HFA) 108 (90 Base) MCG/ACT inhaler Inhale 2 puffs into the lungs every 6 hours as needed for Wheezing  Patient not taking: Reported on 1/24/2022  Rea Polanco DO            Future Appointments   Date Time Provider Shemar Guerrero   6/20/2022  1:00 PM Rea Polanco DO [address] [place] [address]       An electronic signature was used to authenticate this note.     --Rea Polanco DO

## 2022-01-26 ENCOUNTER — TELEPHONE (OUTPATIENT)
Dept: FAMILY MEDICINE CLINIC | Age: 67
End: 2022-01-26

## 2022-01-26 DIAGNOSIS — R00.2 PALPITATIONS: Primary | ICD-10-CM

## 2022-01-26 NOTE — TELEPHONE ENCOUNTER
----- Message from Nabil Lozada sent at 1/26/2022 12:56 PM EST -----  Subject: Message to Provider    QUESTIONS  Information for Provider? [facility] called about his stress test   that is scheduled for 2/1, was told that the office had already given him   the holter monitor. Needs to get this cleared up to either get it from Dr Edelmiro Galeazzi or the order needs to be changed to get it from the hospital.   ---------------------------------------------------------------------------  --------------  [address]  What is the best way for the office to contact you? OK to leave message on   voicemail  Preferred Call Back Phone Number? [phone]  ---------------------------------------------------------------------------  --------------  SCRIPT ANSWERS  Relationship to Patient?  Self

## 2022-01-26 NOTE — TELEPHONE ENCOUNTER
Please change Holter Monitor order from Clinic Performed to Hospital Performed (see doc from Zofia Median, [address]).

## 2022-01-26 NOTE — TELEPHONE ENCOUNTER
Called pt and holter monitor is ordered as clinic performed so the hospital thinks that Dr Lili Cortez has placed the monitor on patient. Can we change order to say hospital performed.

## 2022-01-26 NOTE — TELEPHONE ENCOUNTER
Suki/JOSE called stating patient just called her regarding below issue. Informed Luke Merchant that my Supervisor, Praful Hunt MA just spoke w/patient and had informed him that she was sending msg to Dr. Tom Salgado was sent and that Dr. Serafin Bergeron is seeing patients and will respond when he is able. Luke Merchant stated patient did not mention he already spoke with our ofc. Luke Merchant asked if Dr. Serafin Bergeron has any objection to Holter Monitor being put on at the same time as when patient has Stress Test.   Luke Merchant requested that the ofc contact her first, so that she has all of the info prior to speaking with patient.    [phone].

## 2022-02-01 ENCOUNTER — HOSPITAL ENCOUNTER (OUTPATIENT)
Dept: NON INVASIVE DIAGNOSTICS | Age: 67
Discharge: HOME | End: 2022-02-01
Payer: MEDICARE

## 2022-02-01 ENCOUNTER — HOSPITAL ENCOUNTER (OUTPATIENT)
Dept: NUCLEAR MEDICINE | Age: 67
Discharge: HOME | End: 2022-02-01
Payer: MEDICARE

## 2022-02-01 DIAGNOSIS — R00.2 PALPITATIONS: ICD-10-CM

## 2022-02-01 DIAGNOSIS — R07.89 CHEST TIGHTNESS: ICD-10-CM

## 2022-02-01 LAB
LV EF: 69 %
LVEF MODALITY: NORMAL

## 2022-02-01 PROCEDURE — 93226 XTRNL ECG REC<48 HR SCAN A/R: CPT

## 2022-02-01 PROCEDURE — 93018 CV STRESS TEST I&R ONLY: CPT | Performed by: INTERNAL MEDICINE

## 2022-02-01 PROCEDURE — 3430000000 HC RX DIAGNOSTIC RADIOPHARMACEUTICAL: Performed by: RADIOLOGY

## 2022-02-01 PROCEDURE — 93225 XTRNL ECG REC<48 HRS REC: CPT

## 2022-02-01 PROCEDURE — 78452 HT MUSCLE IMAGE SPECT MULT: CPT

## 2022-02-01 PROCEDURE — 78452 HT MUSCLE IMAGE SPECT MULT: CPT | Performed by: INTERNAL MEDICINE

## 2022-02-01 PROCEDURE — 93017 CV STRESS TEST TRACING ONLY: CPT

## 2022-02-01 PROCEDURE — 93016 CV STRESS TEST SUPVJ ONLY: CPT | Performed by: INTERNAL MEDICINE

## 2022-02-01 PROCEDURE — A9500 TC99M SESTAMIBI: HCPCS | Performed by: RADIOLOGY

## 2022-02-01 RX ADMIN — Medication 10 MILLICURIE: at 07:34

## 2022-02-01 RX ADMIN — Medication 30 MILLICURIE: at 09:19

## 2022-02-01 NOTE — PROGRESS NOTES
Exercise Nuclear Stress Test:    Reason for study: Heart fluttering, Chest heaviness    Resting EKG showed Sinus rhythm, possible septal MI    Exam:  Heart - Regualar; Lungs - Clear    Exercised for 6 minutes and achieved maximal heart rate of 150 , which is 96 % of age predicted target heart rate. Achieved 7 METS. [facility] treadmill score 6  Rate pressure product 27,406    EKG:  No ischemia or arrhythmia during treadmill stress. Up-sloping ST depression noted. BP:  Peak /74  mmHg     Symptoms: No chest pain, mild progressive short of breath - resolved in recovery. Post test complications: None        SPECT image report pending.       Electronically signed by Erin Rodriguez MD on 2/1/2022 at 10:14 AM

## 2022-02-08 ENCOUNTER — TELEPHONE (OUTPATIENT)
Dept: FAMILY MEDICINE CLINIC | Age: 67
End: 2022-02-08

## 2022-02-08 NOTE — TELEPHONE ENCOUNTER
----- Message from Desiree Croftjacquelyn sent at 2/8/2022  3:51 PM EST -----  Subject: Message to Provider    QUESTIONS  Information for Provider? PT is following up with Dr. Krystina Mata to see if he   has received all of his test results before scheduling his f/u appt. PT   requesting call back to schedule once all test results received.  ---------------------------------------------------------------------------  --------------  CALL BACK INFO  What is the best way for the office to contact you? OK to leave message on   voicemail  Preferred Call Back Phone Number? [phone]  ---------------------------------------------------------------------------  --------------  SCRIPT ANSWERS  Relationship to Patient?  Self

## 2022-02-09 NOTE — TELEPHONE ENCOUNTER
Per Dr Belia Malagon:  Still waiting on holter report     Pt informed and advised to call back to schedule.

## 2022-02-17 ENCOUNTER — TELEPHONE (OUTPATIENT)
Dept: FAMILY MEDICINE CLINIC | Age: 67
End: 2022-02-17

## 2022-02-17 NOTE — TELEPHONE ENCOUNTER
----- Message from Ryann Delaney sent at 2/17/2022  9:20 AM EST -----  Subject: Message to Provider    QUESTIONS  Information for Provider? Patient is wanting Dr. Nati Jarvis about him leaving   and going private and he is needing info about his testing that he had   done in January. Please advise. Thanks  ---------------------------------------------------------------------------  --------------  [place] [facility] INFO  What is the best way for the office to contact you? OK to leave message on   voicemail  Preferred Call Back Phone Number? [phone]  ---------------------------------------------------------------------------  --------------  SCRIPT ANSWERS  Relationship to Patient?  Self

## 2022-02-17 NOTE — TELEPHONE ENCOUNTER
Holter monitor revealed no concerning abnormal heart rhythm    Carotid U/S:  Impression   The right internal carotid artery demonstrates 50-69% stenosis.       The left internal carotid artery demonstrates 50-69% stenosis. I would recommend to start cholesterol medication (can send in if willing) and we can refer to vascular surgery or discuss further at follow up.  Thanks

## 2022-02-17 NOTE — TELEPHONE ENCOUNTER
Pt asking for results of his carotid US and his holter monitor. Pt would like results prior to his 3/3/22 appt.

## 2022-03-03 ENCOUNTER — OFFICE VISIT (OUTPATIENT)
Dept: FAMILY MEDICINE CLINIC | Age: 67
End: 2022-03-03
Payer: MEDICARE

## 2022-03-03 VITALS
HEIGHT: 69 IN | WEIGHT: 171 LBS | HEART RATE: 69 BPM | OXYGEN SATURATION: 97 % | BODY MASS INDEX: 25.33 KG/M2 | DIASTOLIC BLOOD PRESSURE: 70 MMHG | RESPIRATION RATE: 16 BRPM | SYSTOLIC BLOOD PRESSURE: 120 MMHG | TEMPERATURE: 97.3 F

## 2022-03-03 DIAGNOSIS — I65.23 ASYMPTOMATIC BILATERAL CAROTID ARTERY STENOSIS: Primary | ICD-10-CM

## 2022-03-03 DIAGNOSIS — R79.9 ABNORMAL FINDING OF BLOOD CHEMISTRY, UNSPECIFIED: ICD-10-CM

## 2022-03-03 DIAGNOSIS — E78.5 DYSLIPIDEMIA: ICD-10-CM

## 2022-03-03 PROCEDURE — 4040F PNEUMOC VAC/ADMIN/RCVD: CPT | Performed by: FAMILY MEDICINE

## 2022-03-03 PROCEDURE — 99214 OFFICE O/P EST MOD 30 MIN: CPT | Performed by: FAMILY MEDICINE

## 2022-03-03 PROCEDURE — 1036F TOBACCO NON-USER: CPT | Performed by: FAMILY MEDICINE

## 2022-03-03 PROCEDURE — G8427 DOCREV CUR MEDS BY ELIG CLIN: HCPCS | Performed by: FAMILY MEDICINE

## 2022-03-03 PROCEDURE — 1123F ACP DISCUSS/DSCN MKR DOCD: CPT | Performed by: FAMILY MEDICINE

## 2022-03-03 PROCEDURE — 3017F COLORECTAL CA SCREEN DOC REV: CPT | Performed by: FAMILY MEDICINE

## 2022-03-03 PROCEDURE — G8484 FLU IMMUNIZE NO ADMIN: HCPCS | Performed by: FAMILY MEDICINE

## 2022-03-03 PROCEDURE — G8417 CALC BMI ABV UP PARAM F/U: HCPCS | Performed by: FAMILY MEDICINE

## 2022-03-03 ASSESSMENT — ENCOUNTER SYMPTOMS
VOMITING: 0
COUGH: 0
ABDOMINAL PAIN: 0
NAUSEA: 0
DIARRHEA: 0
SHORTNESS OF BREATH: 0
CONSTIPATION: 0

## 2022-03-03 NOTE — PROGRESS NOTES
Eloisa Allen (:  1955) is a 78 y.o. male,Established patient, here for evaluation of the following chief complaint(s):  Results (Here to discuss results of cardiac testing and US carotid. states no issues since initial incident. )      ASSESSMENT/PLAN:  1. Asymptomatic bilateral carotid artery stenosis  -     [facility] Vascular Surgery  -     LIPID PANEL; Future  -     Comprehensive Metabolic Panel; Future  -     CBC with Auto Differential; Future  -     Hemoglobin A1C; Future  2. Dyslipidemia  -     [facility] Vascular Surgery  -     LIPID PANEL; Future  -     Comprehensive Metabolic Panel; Future  -     CBC with Auto Differential; Future  -     Hemoglobin A1C; Future  3. Abnormal finding of blood chemistry, unspecified   -     Hemoglobin A1C; Future    As above. Recent cardiac testing and imaging reviewed in office    Recommend intensive medical management with statin therapy and ASA 81 mg daily -patient declining at this time    Refer to vascular surgery     Return in about 3 months (around 6/15/2022) for AWV. SUBJECTIVE/OBJECTIVE:  HPI  Interval Hx:    Stress testing 22:  Impression       The myocardial perfusion imaging was normal.       Overall left ventricular systolic function was normal with no regional   wall motion abnormalities.       Exercise capacity was average.       Low risk exercise myocardial perfusion imaging study. Holter monitor 22:  Normal sinus rhythm, rare PACs and PVCs, no correlating arrhythmias    Carotid U/S 22:  Impression   The right internal carotid artery demonstrates 50-69% stenosis.       The left internal carotid artery demonstrates 50-69% stenosis.       Bilateral vertebral arteries are patent with flow in the normal direction.      Doing well, no further issues    The 10-year ASCVD risk score (Alice Calderón., et al., 2013) is: 10.5%    Values used to calculate the score:      Age: 78 years      Sex: Male      Is Non-  American: No      Diabetic: No      Tobacco smoker: No      Systolic Blood Pressure: 913 mmHg      Is BP treated: No      HDL Cholesterol: 59 mg/dL      Total Cholesterol: 180 mg/dL    Review of Systems   Constitutional: Negative for chills and fever. Respiratory: Negative for cough and shortness of breath. Cardiovascular: Negative for chest pain. Gastrointestinal: Negative for abdominal pain, constipation, diarrhea, nausea and vomiting. Genitourinary: Negative for dysuria. Neurological: Positive for dizziness (on occasion ). Vitals:    03/03/22 1450   BP: 120/70   Pulse: 69   Resp: 16   Temp: 97.3 °F (36.3 °C)   TempSrc: Temporal   SpO2: 97%   Weight: 171 lb (77.6 kg)   Height: 5' 9\" (1.753 m)     Estimated body mass index is 25.25 kg/m² as calculated from the following:    Height as of this encounter: 5' 9\" (1.753 m). Weight as of this encounter: 171 lb (77.6 kg). Physical Exam  HENT:      Head: Normocephalic and atraumatic. Eyes:      Extraocular Movements: Extraocular movements intact. Conjunctiva/sclera: Conjunctivae normal.   Cardiovascular:      Rate and Rhythm: Normal rate. Pulmonary:      Effort: Pulmonary effort is normal. No respiratory distress. Neurological:      Mental Status: He is alert. Mental status is at baseline. Prior to Visit Medications    Medication Sig Taking?  Authorizing Provider   finasteride (PROSCAR) 5 MG tablet  Yes Historical Provider, MD   albuterol sulfate HFA (PROAIR HFA) 108 (90 Base) MCG/ACT inhaler Inhale 2 puffs into the lungs every 6 hours as needed for Wheezing Yes Wong Lee,    tamsulosin (FLOMAX) 0.4 MG capsule Take 0.4 mg by mouth daily  Yes Historical Provider, MD   tadalafil (CIALIS) 5 MG tablet TAKE 1 TABLET BY MOUTH AS NEEDED (FOR ERECTILE DYSFUNCTION) Yes Historical Provider, MD   Multiple Vitamins-Minerals (CENTRUM SILVER ADULT 50+ PO) Take by mouth Yes Historical Provider, MD   Vitamins-Lipotropics (LIPO-FLAVONOID PLUS PO) two with each meal Yes Historical Provider, MD        An electronic signature was used to authenticate this note.     --Angelito Rocha, DO

## 2022-03-04 ENCOUNTER — TELEPHONE (OUTPATIENT)
Dept: VASCULAR SURGERY | Age: 67
End: 2022-03-04

## 2022-03-21 ENCOUNTER — TELEPHONE (OUTPATIENT)
Dept: VASCULAR SURGERY | Age: 67
End: 2022-03-21

## 2022-03-22 ENCOUNTER — OFFICE VISIT (OUTPATIENT)
Dept: VASCULAR SURGERY | Age: 67
End: 2022-03-22
Payer: MEDICARE

## 2022-03-22 VITALS — DIASTOLIC BLOOD PRESSURE: 86 MMHG | SYSTOLIC BLOOD PRESSURE: 132 MMHG

## 2022-03-22 DIAGNOSIS — I65.23 ASYMPTOMATIC CAROTID ARTERY STENOSIS, BILATERAL: Primary | ICD-10-CM

## 2022-03-22 PROCEDURE — 99203 OFFICE O/P NEW LOW 30 MIN: CPT | Performed by: SURGERY

## 2022-03-22 PROCEDURE — G8427 DOCREV CUR MEDS BY ELIG CLIN: HCPCS | Performed by: SURGERY

## 2022-03-22 PROCEDURE — 1036F TOBACCO NON-USER: CPT | Performed by: SURGERY

## 2022-03-22 PROCEDURE — 3017F COLORECTAL CA SCREEN DOC REV: CPT | Performed by: SURGERY

## 2022-03-22 PROCEDURE — G8417 CALC BMI ABV UP PARAM F/U: HCPCS | Performed by: SURGERY

## 2022-03-22 PROCEDURE — G8484 FLU IMMUNIZE NO ADMIN: HCPCS | Performed by: SURGERY

## 2022-03-22 PROCEDURE — 4040F PNEUMOC VAC/ADMIN/RCVD: CPT | Performed by: SURGERY

## 2022-03-22 PROCEDURE — 1123F ACP DISCUSS/DSCN MKR DOCD: CPT | Performed by: SURGERY

## 2022-03-22 NOTE — PROGRESS NOTES
Vascular Surgery Outpatient Consultation      Chief Complaint   Patient presents with    Surgical Consult     Carotid stenosis. Reason for Consult:  Carotid artery stenosis    Requesting Physician:  Dr. Dania Villa:                The patient is a 78 y.o. male who is referred for evaluation of carotid artery stenosis. He is accompanied by his wife. He was seen in the ER in January due to Chest tightness. He has a Carotid Artery US as part of his work up showing mild KAYODE. Past Medical History:        Diagnosis Date    Asthma without status asthmaticus 01/15/2010    DDD (degenerative disc disease), lumbar 11/05/2018    History of Holter monitoring 02/01/2022    Intraepithelial squamous cell carcinoma 12/26/2019    Meniere disease     Prostate cancer ([facility] [address] 09/2018    Pure hypercholesterolemia 01/15/2010     Past Surgical History:        Procedure Laterality Date    CARDIOVASCULAR STRESS TEST N/A 02/01/2022    Exercise nuclear stress test    EPIDURAL STEROID INJECTION N/A 05/02/2019    L4-5 INTERLAMINAR EPIDURAL STEROID INJECTION performed by Doc Lomas DO at [place] [id number] N/A 05/02/2019    l4-5 INTERLAMINAR EPIDURAL STEROID INJECTION    PROSTATE SURGERY      TONSILLECTOMY      childhood     Current Medications:   Prior to Admission medications    Medication Sig Start Date End Date Taking?  Authorizing Provider   finasteride (PROSCAR) 5 MG tablet  4/20/21  Yes Historical Provider, MD   tamsulosin (FLOMAX) 0.4 MG capsule Take 0.4 mg by mouth daily  8/13/20  Yes Historical Provider, MD   tadalafil (CIALIS) 5 MG tablet TAKE 1 TABLET BY MOUTH AS NEEDED (FOR ERECTILE DYSFUNCTION) 10/13/20  Yes Historical Provider, MD   Multiple Vitamins-Minerals (CENTRUM SILVER ADULT 50+ PO) Take by mouth   Yes Historical Provider, MD   Vitamins-Lipotropics (LIPO-FLAVONOID PLUS PO) two with each meal   Yes Historical Provider, MD     Allergies: Penicillins    Social History     Socioeconomic History    Marital status:      Spouse name: Not on file    Number of children: Not on file    Years of education: Not on file    Highest education level: Not on file   Occupational History    Not on file   Tobacco Use    Smoking status: Never Smoker    Smokeless tobacco: Never Used   Vaping Use    Vaping Use: Never used   Substance and Sexual Activity    Alcohol use: No    Drug use: No    Sexual activity: Never   Other Topics Concern    Not on file   Social History Narrative    Not on file     Social Determinants of Health     Financial Resource Strain: Low Risk     Difficulty of Paying Living Expenses: Not hard at all   Food Insecurity: No Food Insecurity    Worried About [address] [employer] in the Last Year: Never true    [employer] in the Last Year: Never true   Transportation Needs:     Lack of Transportation (Medical): Not on file    Lack of Transportation (Non-Medical):  Not on file   Physical Activity:     Days of Exercise per Week: Not on file    Minutes of Exercise per Session: Not on file   Stress:     Feeling of Stress : Not on file   Social Connections:     Frequency of Communication with Friends and Family: Not on file    Frequency of Social Gatherings with Friends and Family: Not on file    Attends Sabianist Services: Not on file    Active Member of [address] [place] [address] [employer] or Organizations: Not on file    Attends Club or Organization Meetings: Not on file    Marital Status: Not on file   Intimate Partner Violence:     Fear of Current or Ex-Partner: Not on file    Emotionally Abused: Not on file    Physically Abused: Not on file    Sexually Abused: Not on file   Housing Stability:     Unable to Pay for Housing in the Last Year: Not on file    Number of [place] in the Last Year: Not on file    Unstable Housing in the Last Year: Not on file        Family History   Problem Relation Age of Onset    Prostate Cancer Brother     Heart Disease Mother     Other Mother         GI bleed    Other Father         accident        REVIEW OF SYSTEMS (New symptoms):    Eyes:      Blurred vision:  No [x]/Yes []               Diplopia:   No [x]/Yes []               Vision loss:       No [x]/Yes []   Ears, nose, throat:             Hearing loss:    No [x]/Yes []      Vertigo:   No [x]/Yes []                       Swallowing problem:  No [x]/Yes []               Nose bleeds:   No [x]/Yes []      Voice hoarseness:  No [x]/Yes []  Respiratory:             Cough:   No [x]/Yes []      Pleuritic chest pain:  No [x]/Yes []                        Dyspnea:   No [x]/Yes []      Wheezing:   No [x]/Yes []  Cardiovascular:             Angina:   No [x]/Yes []      Palpitations:   No [x]/Yes []          Claudication:    No [x]/Yes []      Leg swelling:   No [x]/Yes []  Gastrointestinal:             Nausea or vomiting:  No [x]/Yes []               Abdominal pain:  No [x]/Yes []                     Intestinal bleeding: No [x]/Yes []  Musculoskeletal:             Leg pain:   No [x]/Yes []      Back pain:   No [x]/Yes []                    Weakness:   No [x]/Yes []  Neurologic:             Numbness:   No [x]/Yes []      Paralysis:   No [x]/Yes []                       Headaches:   No [x]/Yes []  Hematologic, lymphatic:   Anemia:   No [x]/Yes []              Bleeding or bruising:  No [x]/Yes []              Fevers or chills: No [x]/Yes []  Endocrine:             Temp intolerance:   No [x]/Yes []                       Polydipsia, polyuria:  No [x]/Yes []  Skin:              Rash:    No [x]/Yes []      Ulcers:   No [x]/Yes []              Abnorm pigment: No [x]/Yes []  :              Frequency/urgency:  No [x]/Yes []      Hematuria:    No [x]/Yes []                      Incontinence:    No [x]/Yes []    PHYSICAL EXAM:  Vitals:    03/22/22 1438   BP: 132/86     General Appearance: alert and oriented to person, place and time, well developed and well- nourished, in no acute distress  Skin: warm and dry, no rash or erythema  Head: normocephalic and atraumatic  Eyes: extraocular eye movements intact, conjunctivae normal  ENT: external ear and ear canal normal bilaterally, nose without deformity  Pulmonary/Chest: clear to auscultation bilaterally- no wheezes, rales or rhonchi, normal air movement, no respiratory distress  Cardiovascular: normal rate, regular rhythm, normal S1 and S2, no murmurs, no carotid bruits  Abdomen: soft, non-tender, non-distended, normal bowel sounds, no masses or organomegaly  Musculoskeletal: normal range of motion, no joint swelling, deformity or tenderness  Neurologic: no cranial nerve deficit, gait, coordination and speech normal  Extremities: no leg edema bilaterally    PULSE EXAM      Right      Left   Brachial     Radial     Femoral     Popliteal     Dorsalis Pedis     Posterior Tibial     (3=normal, 2=diminished, 1=barely palpable, 4=widened)      Problem List Items Addressed This Visit        Vascular Problems    Asymptomatic carotid artery stenosis, bilateral - Primary          I reviewed the results of the ultrasound with the patient. Mild carotid artery stenosis. I reviewed the natural history and treatment options of carotid artery disease. I reviewed the signs and symptoms of stroke, and instructions if symptoms occur. Pt seen and plan reviewed with Dr. Sina Muñiz. I reviewed with the patient that from my standpoint he can continue with all normal activities. I will plan to see him again in one year but asked him to call sooner with any new problems. Return in about 1 year (around 3/22/2023) for testing. MARIO Patino NP    Agree. I reviewed with the patient that he has mild stenosis involving his carotid arteries and that this certainly was not contributing to his chest pain. I do not feel that he requires any additional testing or intervention at this time. I asked him to continue taking a daily aspirin.   I will plan to see him again in 1 year but asked him to call sooner with any problems.

## 2022-06-03 ENCOUNTER — TELEPHONE (OUTPATIENT)
Dept: ADMINISTRATIVE | Age: 67
End: 2022-06-03

## 2022-06-03 DIAGNOSIS — R79.9 ABNORMAL FINDING OF BLOOD CHEMISTRY, UNSPECIFIED: ICD-10-CM

## 2022-06-03 DIAGNOSIS — E78.5 DYSLIPIDEMIA: ICD-10-CM

## 2022-06-03 DIAGNOSIS — I65.23 ASYMPTOMATIC BILATERAL CAROTID ARTERY STENOSIS: ICD-10-CM

## 2022-06-03 LAB
ALBUMIN SERPL-MCNC: 4.4 G/DL (ref 3.5–5.2)
ALP BLD-CCNC: 66 U/L (ref 40–129)
ALT SERPL-CCNC: 34 U/L (ref 0–40)
ANION GAP SERPL CALCULATED.3IONS-SCNC: 17 MMOL/L (ref 7–16)
AST SERPL-CCNC: 34 U/L (ref 0–39)
BASOPHILS ABSOLUTE: 0.06 E9/L (ref 0–0.2)
BASOPHILS RELATIVE PERCENT: 0.9 % (ref 0–2)
BILIRUB SERPL-MCNC: 0.5 MG/DL (ref 0–1.2)
BUN BLDV-MCNC: 18 MG/DL (ref 6–23)
CALCIUM SERPL-MCNC: 9.1 MG/DL (ref 8.6–10.2)
CHLORIDE BLD-SCNC: 103 MMOL/L (ref 98–107)
CHOLESTEROL, TOTAL: 199 MG/DL (ref 0–199)
CO2: 20 MMOL/L (ref 22–29)
CREAT SERPL-MCNC: 1.3 MG/DL (ref 0.7–1.2)
EOSINOPHILS ABSOLUTE: 0.16 E9/L (ref 0.05–0.5)
EOSINOPHILS RELATIVE PERCENT: 2.4 % (ref 0–6)
GFR AFRICAN AMERICAN: >60
GFR NON-AFRICAN AMERICAN: 55 ML/MIN/1.73
GLUCOSE BLD-MCNC: 110 MG/DL (ref 74–99)
HBA1C MFR BLD: 5.2 % (ref 4–5.6)
HCT VFR BLD CALC: 49.6 % (ref 37–54)
HDLC SERPL-MCNC: 63 MG/DL
HEMOGLOBIN: 16.2 G/DL (ref 12.5–16.5)
IMMATURE GRANULOCYTES #: 0.01 E9/L
IMMATURE GRANULOCYTES %: 0.2 % (ref 0–5)
LDL CHOLESTEROL CALCULATED: 113 MG/DL (ref 0–99)
LYMPHOCYTES ABSOLUTE: 1.57 E9/L (ref 1.5–4)
LYMPHOCYTES RELATIVE PERCENT: 23.9 % (ref 20–42)
MCH RBC QN AUTO: 32.9 PG (ref 26–35)
MCHC RBC AUTO-ENTMCNC: 32.7 % (ref 32–34.5)
MCV RBC AUTO: 100.8 FL (ref 80–99.9)
MONOCYTES ABSOLUTE: 0.63 E9/L (ref 0.1–0.95)
MONOCYTES RELATIVE PERCENT: 9.6 % (ref 2–12)
NEUTROPHILS ABSOLUTE: 4.15 E9/L (ref 1.8–7.3)
NEUTROPHILS RELATIVE PERCENT: 63 % (ref 43–80)
PDW BLD-RTO: 13 FL (ref 11.5–15)
PLATELET # BLD: 331 E9/L (ref 130–450)
PMV BLD AUTO: 9.1 FL (ref 7–12)
POTASSIUM SERPL-SCNC: 4.6 MMOL/L (ref 3.5–5)
RBC # BLD: 4.92 E12/L (ref 3.8–5.8)
SODIUM BLD-SCNC: 140 MMOL/L (ref 132–146)
TOTAL PROTEIN: 7.3 G/DL (ref 6.4–8.3)
TRIGL SERPL-MCNC: 114 MG/DL (ref 0–149)
VLDLC SERPL CALC-MCNC: 23 MG/DL
WBC # BLD: 6.6 E9/L (ref 4.5–11.5)

## 2022-06-03 NOTE — TELEPHONE ENCOUNTER
Called and left message for patient to call office to get scheduled with Dr. Zurdo Sargent patient is established with her and LOV 08/2021.

## 2022-06-07 ENCOUNTER — OFFICE VISIT (OUTPATIENT)
Dept: PHYSICAL MEDICINE AND REHAB | Age: 67
End: 2022-06-07
Payer: MEDICARE

## 2022-06-07 VITALS
WEIGHT: 173 LBS | HEIGHT: 69 IN | DIASTOLIC BLOOD PRESSURE: 83 MMHG | HEART RATE: 73 BPM | BODY MASS INDEX: 25.62 KG/M2 | SYSTOLIC BLOOD PRESSURE: 126 MMHG

## 2022-06-07 DIAGNOSIS — G89.29 CHRONIC LEFT-SIDED LOW BACK PAIN WITH LEFT-SIDED SCIATICA: ICD-10-CM

## 2022-06-07 DIAGNOSIS — M48.062 LUMBAR STENOSIS WITH NEUROGENIC CLAUDICATION: ICD-10-CM

## 2022-06-07 DIAGNOSIS — M54.17 RADICULOPATHY, LUMBOSACRAL REGION: Primary | ICD-10-CM

## 2022-06-07 DIAGNOSIS — M54.42 CHRONIC LEFT-SIDED LOW BACK PAIN WITH LEFT-SIDED SCIATICA: ICD-10-CM

## 2022-06-07 PROBLEM — N18.30 CHRONIC RENAL DISEASE, STAGE III (HCC): Status: ACTIVE | Noted: 2022-06-07

## 2022-06-07 PROCEDURE — 1036F TOBACCO NON-USER: CPT | Performed by: PHYSICAL MEDICINE & REHABILITATION

## 2022-06-07 PROCEDURE — 1123F ACP DISCUSS/DSCN MKR DOCD: CPT | Performed by: PHYSICAL MEDICINE & REHABILITATION

## 2022-06-07 PROCEDURE — G8417 CALC BMI ABV UP PARAM F/U: HCPCS | Performed by: PHYSICAL MEDICINE & REHABILITATION

## 2022-06-07 PROCEDURE — G8427 DOCREV CUR MEDS BY ELIG CLIN: HCPCS | Performed by: PHYSICAL MEDICINE & REHABILITATION

## 2022-06-07 PROCEDURE — 3017F COLORECTAL CA SCREEN DOC REV: CPT | Performed by: PHYSICAL MEDICINE & REHABILITATION

## 2022-06-07 PROCEDURE — 99214 OFFICE O/P EST MOD 30 MIN: CPT | Performed by: PHYSICAL MEDICINE & REHABILITATION

## 2022-06-07 RX ORDER — PREGABALIN 75 MG/1
75 CAPSULE ORAL 2 TIMES DAILY
Qty: 60 CAPSULE | Refills: 0 | Status: SHIPPED
Start: 2022-06-07 | End: 2022-06-28

## 2022-06-07 RX ORDER — ASPIRIN 81 MG/1
81 TABLET ORAL DAILY
COMMUNITY
End: 2022-06-28

## 2022-06-07 NOTE — PROGRESS NOTES
Peggy Quiñones, [address] [facility] Physical Medicine and Rehabilitation  [address] [facility]. [address] [place] [address] [place]  Phone: [phone]  Fax: [phone]    PCP: Annemarie Asif DO  Date of visit: 6/7/22    Chief Complaint   Patient presents with    Hip Pain     follow up     Interval:   Patient presents today for office visit regarding low back and left hip pain. He reports low back and left leg pain that has been constant for the past 1-2 months. The pain is located in the low back that radiates into the left hip. He reports feeling of numbness in his thighs and cramping in his legs. Pain is worse with bending over, riding his motorcycle, walking. Better with rest. There is no numbness or tingling. No weakness. He had radiation treatment for prostate cancer and feels his energy is lower. The pain is rated Pain Score:   7. Takes aleve and voltaren gel PRN. Doing HEP daily. Allergies   Allergen Reactions    Penicillins        Current Outpatient Medications   Medication Sig Dispense Refill    aspirin 81 MG EC tablet Take 81 mg by mouth daily      pregabalin (LYRICA) 75 MG capsule Take 1 capsule by mouth 2 times daily for 30 days. 60 capsule 0    finasteride (PROSCAR) 5 MG tablet       tamsulosin (FLOMAX) 0.4 MG capsule Take 0.4 mg by mouth daily       tadalafil (CIALIS) 5 MG tablet TAKE 1 TABLET BY MOUTH AS NEEDED (FOR ERECTILE DYSFUNCTION)      Multiple Vitamins-Minerals (CENTRUM SILVER ADULT 50+ PO) Take by mouth      Vitamins-Lipotropics (LIPO-FLAVONOID PLUS PO) two with each meal       No current facility-administered medications for this visit.        Past Medical History:   Diagnosis Date    Asthma without status asthmaticus 01/15/2010    DDD (degenerative disc disease), lumbar 11/05/2018    History of Holter monitoring 02/01/2022    Intraepithelial squamous cell carcinoma 12/26/2019    Meniere disease     [facility]    Pure hypercholesterolemia 01/15/2010       Past Surgical History:   Procedure Laterality Date    CARDIOVASCULAR STRESS TEST N/A 02/01/2022    Exercise nuclear stress test    EPIDURAL STEROID INJECTION N/A 05/02/2019    L4-5 INTERLAMINAR EPIDURAL STEROID INJECTION performed by Paul Pires DO at [place] [id number] N/A 05/02/2019    l4-5 INTERLAMINAR EPIDURAL STEROID INJECTION    PROSTATE SURGERY      TONSILLECTOMY      childhood       Family History   Problem Relation Age of Onset    Prostate Cancer Brother     Heart Disease Mother     Other Mother         GI bleed    Other Father         accident        Social History     Tobacco Use    Smoking status: Never Smoker    Smokeless tobacco: Never Used   Vaping Use    Vaping Use: Never used   Substance Use Topics    Alcohol use: No    Drug use: No          Functional Status: The patient is able to ambulate and perform activities of daily living without the use of an assistive device. Occupation: The patient is currently retired. ROS:    Constitutional: Denies fevers, chills, night sweats, unintentional weight loss     Skin: Denies rash or skin changes     Eyes: Denies vision changes    Ears/Nose/Throat: Denies nasal congestion or sore throat     Respiratory: Denies SOB or cough     Cardiovascular: Denies CP, palpitations, edema      Gastrointestinal: Denies abdominal pain,  N/V, constipation, or diarrhea    Genitourinary: Denies urinary symptoms    Neurologic: See HPI.     MSK: See HPI. Psychiatric: Denies sleep disturbance, anxiety, depression    Hematologic/Lymphatic/Immunologic: Denies bruising       Physical Exam:   Blood pressure 126/83, pulse 73, height 5' 9\" (1.753 m), weight 173 lb (78.5 kg). General: well developed and well nourished in no acute distress. Body habitus is normal,. HEENT: No rhinorrhea, sneezing, yawning, or lacrimation. No scleral icterus or conjunctival injection.   Resp: symmetrical chest expansion, unlabored breathing, respirations unlabored. CV: Heart rate is regular. Peripheral pulses are palpable  Lymph: No visible regional lymphadenopathy. Skin: No rashes or ecchymosis. Normal turgor. Psych: Mood is calm. Affect is normal.   Ext: No edema noted     MSK:   Back/Hip Exam:   Inspection: Pelvis was symmetric. Lumbar lordotic curvature was decreased. There was no scoliosis. No ecchymoses or erythema. Palpation: Palpatory exam revealed no tenderness along lumbosacral paraspinals, midline spine, SI joint sulcus, greater trochanters and TFL on both side. ROM decreased. Pain with flexion. SLR negative     Neurological Exam:  Strength:   R  L  Hip Flex  5  5  Knee Ext  5  5  Ankle dorsi  5  5  EHL   5 5  Ankle Plantar  5  5    Sensation LT intact BLE     Reflexes:   R  L  Patellar  (2+) (2+)  Ankle Jerk  (2+) (2+)    Gait is normal.         Impression:   Jac Ramirez is a 78 y.o. male    1. Radiculopathy, lumbosacral region    2. Chronic left-sided low back pain with left-sided sciatica    3. Lumbar stenosis with neurogenic claudication        Plan:   · Will get updated lumbar MRI for continued symptoms, history of prostate cancer. · Start lyrica 75mg BID. Titrate up as needed. · Discussed doing epidural after MRI is completed. · Continue HEP        The patient was educated about the diagnosis, prognosis, indications, risks and benefits of treatment. An opportunity to ask questions was given to the patient and questions were answered. The patient agreed to proceed with the recommended treatment as described above. Heather Carpenter, DO, [address] [place] [address]   Board Certified Physical Medicine and Rehabilitation      Controlled Substance Monitoring:    Acute and Chronic Pain Monitoring:   RX Monitoring 6/7/2022   Periodic Controlled Substance Monitoring No signs of potential drug abuse or diversion identified.

## 2022-06-16 ENCOUNTER — TELEPHONE (OUTPATIENT)
Dept: PHYSICAL MEDICINE AND REHAB | Age: 67
End: 2022-06-16

## 2022-06-16 NOTE — TELEPHONE ENCOUNTER
----- Message from Meenu Logan DO sent at 6/16/2022  3:47 PM EDT -----  Test result was abnormal.  Please schedule follow up to discuss results and determine treatment plan.

## 2022-06-16 NOTE — TELEPHONE ENCOUNTER
Called and LVM for patient to return our call, need to inform him of provider message Test result was abnormal.  Please schedule follow up to discuss results and determine treatment plan. Patient needs scheduled with Dr. Mayra Raya.  Will wait for patient to return our call.

## 2022-06-17 ENCOUNTER — TELEPHONE (OUTPATIENT)
Dept: PHYSICAL MEDICINE AND REHAB | Age: 67
End: 2022-06-17

## 2022-06-17 NOTE — TELEPHONE ENCOUNTER
Patient called in stating that he was prescribed Lyrica and started taking it but for the last 5 days he has experienced rectal bleeding and he read that any abnormal bleeding is a side effect of the medication and he would like to know what he should do. I advised patient to call his PCP for direction as both providers here are out of the office and he could not just stop taking this medication. Patient said he would call his PCP for direction.

## 2022-06-20 DIAGNOSIS — K62.5 RECTAL BLEEDING: ICD-10-CM

## 2022-06-20 LAB
BASOPHILS ABSOLUTE: 0.06 E9/L (ref 0–0.2)
BASOPHILS RELATIVE PERCENT: 0.7 % (ref 0–2)
EOSINOPHILS ABSOLUTE: 0.11 E9/L (ref 0.05–0.5)
EOSINOPHILS RELATIVE PERCENT: 1.3 % (ref 0–6)
HCT VFR BLD CALC: 50 % (ref 37–54)
HEMOGLOBIN: 16.5 G/DL (ref 12.5–16.5)
IMMATURE GRANULOCYTES #: 0.03 E9/L
IMMATURE GRANULOCYTES %: 0.4 % (ref 0–5)
LYMPHOCYTES ABSOLUTE: 1.49 E9/L (ref 1.5–4)
LYMPHOCYTES RELATIVE PERCENT: 17.7 % (ref 20–42)
MCH RBC QN AUTO: 33.4 PG (ref 26–35)
MCHC RBC AUTO-ENTMCNC: 33 % (ref 32–34.5)
MCV RBC AUTO: 101.2 FL (ref 80–99.9)
MONOCYTES ABSOLUTE: 0.87 E9/L (ref 0.1–0.95)
MONOCYTES RELATIVE PERCENT: 10.3 % (ref 2–12)
NEUTROPHILS ABSOLUTE: 5.85 E9/L (ref 1.8–7.3)
NEUTROPHILS RELATIVE PERCENT: 69.6 % (ref 43–80)
PDW BLD-RTO: 13 FL (ref 11.5–15)
PLATELET # BLD: 335 E9/L (ref 130–450)
PMV BLD AUTO: 9.3 FL (ref 7–12)
RBC # BLD: 4.94 E12/L (ref 3.8–5.8)
WBC # BLD: 8.4 E9/L (ref 4.5–11.5)

## 2022-06-22 NOTE — TELEPHONE ENCOUNTER
Called and spoke with the patient and he has stopped the lyrica and he also saw his PCP. The bleeding has stopped since stopping his lyrica.

## 2022-06-22 NOTE — TELEPHONE ENCOUNTER
Dr. Nunez Dorota please see original message and advise. Routed to Dr. Rama Melo by mistake the second time.

## 2022-06-27 ENCOUNTER — OFFICE VISIT (OUTPATIENT)
Dept: PHYSICAL MEDICINE AND REHAB | Age: 67
End: 2022-06-27
Payer: MEDICARE

## 2022-06-27 VITALS
HEIGHT: 69 IN | BODY MASS INDEX: 25.03 KG/M2 | HEART RATE: 76 BPM | DIASTOLIC BLOOD PRESSURE: 78 MMHG | WEIGHT: 169 LBS | SYSTOLIC BLOOD PRESSURE: 135 MMHG

## 2022-06-27 DIAGNOSIS — M48.062 LUMBAR STENOSIS WITH NEUROGENIC CLAUDICATION: Primary | ICD-10-CM

## 2022-06-27 DIAGNOSIS — G89.29 CHRONIC BILATERAL LOW BACK PAIN WITHOUT SCIATICA: ICD-10-CM

## 2022-06-27 DIAGNOSIS — M54.50 CHRONIC BILATERAL LOW BACK PAIN WITHOUT SCIATICA: ICD-10-CM

## 2022-06-27 DIAGNOSIS — M43.10 RETROLISTHESIS: ICD-10-CM

## 2022-06-27 PROCEDURE — 1036F TOBACCO NON-USER: CPT | Performed by: PHYSICAL MEDICINE & REHABILITATION

## 2022-06-27 PROCEDURE — G8427 DOCREV CUR MEDS BY ELIG CLIN: HCPCS | Performed by: PHYSICAL MEDICINE & REHABILITATION

## 2022-06-27 PROCEDURE — 3017F COLORECTAL CA SCREEN DOC REV: CPT | Performed by: PHYSICAL MEDICINE & REHABILITATION

## 2022-06-27 PROCEDURE — 1123F ACP DISCUSS/DSCN MKR DOCD: CPT | Performed by: PHYSICAL MEDICINE & REHABILITATION

## 2022-06-27 PROCEDURE — G8420 CALC BMI NORM PARAMETERS: HCPCS | Performed by: PHYSICAL MEDICINE & REHABILITATION

## 2022-06-27 PROCEDURE — 99214 OFFICE O/P EST MOD 30 MIN: CPT | Performed by: PHYSICAL MEDICINE & REHABILITATION

## 2022-06-27 RX ORDER — SODIUM CHLORIDE 9 MG/ML
INJECTION, SOLUTION INTRAVENOUS PRN
Status: CANCELLED | OUTPATIENT
Start: 2022-06-27

## 2022-06-27 RX ORDER — SODIUM CHLORIDE 0.9 % (FLUSH) 0.9 %
5-40 SYRINGE (ML) INJECTION EVERY 12 HOURS SCHEDULED
Status: CANCELLED | OUTPATIENT
Start: 2022-06-27

## 2022-06-27 RX ORDER — SODIUM CHLORIDE 0.9 % (FLUSH) 0.9 %
5-40 SYRINGE (ML) INJECTION PRN
Status: CANCELLED | OUTPATIENT
Start: 2022-06-27

## 2022-06-27 NOTE — PROGRESS NOTES
Edgaralfredo Quiñones, [address] [facility] Physical Medicine and Rehabilitation  [address] [facility] [place]  Phone: [phone]  Fax: [phone]    PCP: Annemarie Asif DO  Date of visit: 6/27/22    Chief Complaint   Patient presents with    Back Pain     MRI results     Interval:   Patient presents today for office visit to review MRI. MRI reveals severe stenosis at multiple levels, facet arthropathy, disc protrusion at L2-3. He started lyrica and did have some relief with it but noticed rectal bleeding. He was also taking ASA and voltaren at the same time as this. He stopped everything and rectal bleeding is better. he does have a hemorrhoid he is going to see general surgery for. He has continued low back and leg pain. The pain is located in the low back that radiates into the left hip. He reports feeling of numbness in his thighs and cramping in his legs. Pain is worse with bending over, riding his motorcycle, walking. Better with rest. There is no numbness or tingling. No weakness. He had radiation treatment for prostate cancer and feels his energy is lower. The pain is rated Pain Score:   8. Takes aleve and voltaren gel PRN. Tried lyrica   Doing HEP daily. Allergies   Allergen Reactions    Penicillins        Current Outpatient Medications   Medication Sig Dispense Refill    hydrocortisone 2.5 % cream Apply topically 2 times daily. 28 g 0    nystatin (MYCOSTATIN) 949526 UNIT/GM cream Apply topically 2 times daily.  30 g 0    finasteride (PROSCAR) 5 MG tablet       tamsulosin (FLOMAX) 0.4 MG capsule Take 0.4 mg by mouth daily       tadalafil (CIALIS) 5 MG tablet TAKE 1 TABLET BY MOUTH AS NEEDED (FOR ERECTILE DYSFUNCTION)      Multiple Vitamins-Minerals (CENTRUM SILVER ADULT 50+ PO) Take by mouth      Vitamins-Lipotropics (LIPO-FLAVONOID PLUS PO) two with each meal      aspirin 81 MG EC tablet Take 81 mg by mouth daily  (Patient not taking: Reported on 6/27/2022)  pregabalin (LYRICA) 75 MG capsule Take 1 capsule by mouth 2 times daily for 30 days. (Patient not taking: Reported on 6/27/2022) 60 capsule 0     No current facility-administered medications for this visit. Past Medical History:   Diagnosis Date    Asthma without status asthmaticus 01/15/2010    DDD (degenerative disc disease), lumbar 11/05/2018    History of Holter monitoring 02/01/2022    Intraepithelial squamous cell carcinoma 12/26/2019    Meniere disease     Prostate cancer ([facility] [address] 09/2018    Pure hypercholesterolemia 01/15/2010       Past Surgical History:   Procedure Laterality Date    CARDIOVASCULAR STRESS TEST N/A 02/01/2022    Exercise nuclear stress test    EPIDURAL STEROID INJECTION N/A 05/02/2019    L4-5 INTERLAMINAR EPIDURAL STEROID INJECTION performed by Gus Jesus DO at [place] [id number] N/A 05/02/2019    l4-5 INTERLAMINAR EPIDURAL STEROID INJECTION    PROSTATE SURGERY      TONSILLECTOMY      childhood       Family History   Problem Relation Age of Onset    Prostate Cancer Brother     Heart Disease Mother     Other Mother         GI bleed    Other Father         accident        Social History     Tobacco Use    Smoking status: Never Smoker    Smokeless tobacco: Never Used   Vaping Use    Vaping Use: Never used   Substance Use Topics    Alcohol use: Not Currently     Comment: 1-3 cans of beer per day.  Drug use: No          Functional Status: The patient is able to ambulate and perform activities of daily living without the use of an assistive device. Occupation: The patient is currently retired.        ROS:    Constitutional: Denies fevers, chills, night sweats, unintentional weight loss     Skin: Denies rash or skin changes     Eyes: Denies vision changes    Ears/Nose/Throat: Denies nasal congestion or sore throat     Respiratory: Denies SOB or cough     Cardiovascular: Denies CP, palpitations, edema      Gastrointestinal: Denies abdominal pain,  N/V, constipation, or diarrhea    Genitourinary: Denies urinary symptoms    Neurologic: See HPI.     MSK: See HPI. Psychiatric: Denies sleep disturbance, anxiety, depression    Hematologic/Lymphatic/Immunologic: Denies bruising       Physical Exam:   Blood pressure 135/78, pulse 76, height 5' 9\" (1.753 m), weight 169 lb (76.7 kg). General: well developed and well nourished in no acute distress. Body habitus is normal,. HEENT: No rhinorrhea, sneezing, yawning, or lacrimation. No scleral icterus or conjunctival injection. Resp: symmetrical chest expansion, unlabored breathing, respirations unlabored. CV: Heart rate is regular. Peripheral pulses are palpable  Lymph: No visible regional lymphadenopathy. Skin: No rashes or ecchymosis. Normal turgor. Psych: Mood is calm. Affect is normal.   Ext: No edema noted     MSK:   Back/Hip Exam:   Inspection: Pelvis was symmetric. Lumbar lordotic curvature was decreased. There was no scoliosis. No ecchymoses or erythema. Palpation: Palpatory exam revealed no tenderness along lumbosacral paraspinals, midline spine, SI joint sulcus, greater trochanters and TFL on both side. ROM decreased. Pain with flexion. SLR negative     Neurological Exam:  Strength:   R  L  Hip Flex  5  5  Knee Ext  5  5  Ankle dorsi  5  5  EHL   5 5  Ankle Plantar  5  5    Sensation LT intact BLE     Reflexes:   R  L  Patellar  (2+) (2+)  Ankle Jerk  (2+) (2+)    Gait is normal.         Impression:   Sayda Juarez is a 78 y.o. male    1. Lumbar stenosis with neurogenic claudication    2. Retrolisthesis    3. Chronic bilateral low back pain without sciatica        Plan:   · MRI L spine reviewed in detail with Dante Lara. There is multi-level DDD, disc protrusion, facet arthropathy, severe lumbar stenosis. Discussed treatment options including medications, injections, surgery. ? Rectal bleeding with lyrica. He would benefit from L4-5 ILESI.  Procedure risks, benefits and alternatives were discussed. Patient would like to proceed. · Continue HEP   · Obtain flex/ext lumbar films        The patient was educated about the diagnosis, prognosis, indications, risks and benefits of treatment. An opportunity to ask questions was given to the patient and questions were answered. The patient agreed to proceed with the recommended treatment as described above.      DO Foz[place], [facility]   Board Certified Physical Medicine and Rehabilitation

## 2022-06-27 NOTE — H&P
[employer], [address] [facility] Rehabilitation  [address]  Phone: [phone]  Fax: [phone]    PCP: [facility]   Date of visit: 6/27/22    Chief Complaint   Patient presents with    Back Pain     MRI results     Interval:   Patient presents today for office visit to review MRI. MRI reveals severe stenosis at multiple levels, facet arthropathy, disc protrusion at L2-3. He started lyrica and did have some relief with it but noticed rectal bleeding. He was also taking ASA and voltaren at the same time as this. He stopped everything and rectal bleeding is better. he does have a hemorrhoid he is going to see general surgery for. He has continued low back and leg pain. The pain is located in the low back that radiates into the left hip. He reports feeling of numbness in his thighs and cramping in his legs. Pain is worse with bending over, riding his motorcycle, walking. Better with rest. There is no numbness or tingling. No weakness. He had radiation treatment for prostate cancer and feels his energy is lower. The pain is rated Pain Score:   8. Takes aleve and voltaren gel PRN. Tried lyrica   Doing HEP daily. Allergies   Allergen Reactions    Penicillins        Current Outpatient Medications   Medication Sig Dispense Refill    hydrocortisone 2.5 % cream Apply topically 2 times daily. 28 g 0    nystatin (MYCOSTATIN) 686327 UNIT/GM cream Apply topically 2 times daily.  30 g 0    finasteride (PROSCAR) 5 MG tablet       tamsulosin (FLOMAX) 0.4 MG capsule Take 0.4 mg by mouth daily       tadalafil (CIALIS) 5 MG tablet TAKE 1 TABLET BY MOUTH AS NEEDED (FOR ERECTILE DYSFUNCTION)      Multiple Vitamins-Minerals (CENTRUM SILVER ADULT 50+ PO) Take by mouth      Vitamins-Lipotropics (LIPO-FLAVONOID PLUS PO) two with each meal      aspirin 81 MG EC tablet Take 81 mg by mouth daily  (Patient not taking: Reported on 6/27/2022)  pregabalin (LYRICA) 75 MG capsule Take 1 capsule by mouth 2 times daily for 30 days. (Patient not taking: Reported on 6/27/2022) 60 capsule 0     No current facility-administered medications for this visit. Past Medical History:   Diagnosis Date    Asthma without status asthmaticus 01/15/2010    DDD (degenerative disc disease), lumbar 11/05/2018    History of Holter monitoring 02/01/2022    Intraepithelial squamous cell carcinoma 12/26/2019    Meniere disease     Prostate cancer ([facility] [address] 09/2018    Pure hypercholesterolemia 01/15/2010       Past Surgical History:   Procedure Laterality Date    CARDIOVASCULAR STRESS TEST N/A 02/01/2022    Exercise nuclear stress test    EPIDURAL STEROID INJECTION N/A 05/02/2019    L4-5 INTERLAMINAR EPIDURAL STEROID INJECTION performed by Savannah Guillen DO at [place] [id number] N/A 05/02/2019    l4-5 INTERLAMINAR EPIDURAL STEROID INJECTION    PROSTATE SURGERY      TONSILLECTOMY      childhood       Family History   Problem Relation Age of Onset    Prostate Cancer Brother     Heart Disease Mother     Other Mother         GI bleed    Other Father         accident        Social History     Tobacco Use    Smoking status: Never Smoker    Smokeless tobacco: Never Used   Vaping Use    Vaping Use: Never used   Substance Use Topics    Alcohol use: Not Currently     Comment: 1-3 cans of beer per day.  Drug use: No          Functional Status: The patient is able to ambulate and perform activities of daily living without the use of an assistive device. Occupation: The patient is currently retired.        ROS:    Constitutional: Denies fevers, chills, night sweats, unintentional weight loss     Skin: Denies rash or skin changes     Eyes: Denies vision changes    Ears/Nose/Throat: Denies nasal congestion or sore throat     Respiratory: Denies SOB or cough     Cardiovascular: Denies CP, palpitations, edema      Gastrointestinal: Denies abdominal pain,  N/V, constipation, or diarrhea    Genitourinary: Denies urinary symptoms    Neurologic: See HPI.     MSK: See HPI. Psychiatric: Denies sleep disturbance, anxiety, depression    Hematologic/Lymphatic/Immunologic: Denies bruising       Physical Exam:   Blood pressure 135/78, pulse 76, height 5' 9\" (1.753 m), weight 169 lb (76.7 kg). General: well developed and well nourished in no acute distress. Body habitus is normal,. HEENT: No rhinorrhea, sneezing, yawning, or lacrimation. No scleral icterus or conjunctival injection. Resp: symmetrical chest expansion, unlabored breathing, respirations unlabored. CV: Heart rate is regular. Peripheral pulses are palpable  Lymph: No visible regional lymphadenopathy. Skin: No rashes or ecchymosis. Normal turgor. Psych: Mood is calm. Affect is normal.   Ext: No edema noted     MSK:   Back/Hip Exam:   Inspection: Pelvis was symmetric. Lumbar lordotic curvature was decreased. There was no scoliosis. No ecchymoses or erythema. Palpation: Palpatory exam revealed no tenderness along lumbosacral paraspinals, midline spine, SI joint sulcus, greater trochanters and TFL on both side. ROM decreased. Pain with flexion. SLR negative     Neurological Exam:  Strength:   R  L  Hip Flex  5  5  Knee Ext  5  5  Ankle dorsi  5  5  EHL   5 5  Ankle Plantar  5  5    Sensation LT intact BLE     Reflexes:   R  L  Patellar  (2+) (2+)  Ankle Jerk  (2+) (2+)    Gait is normal.         Impression:   Lalo Delatorre is a 78 y.o. male    1. Lumbar stenosis with neurogenic claudication    2. Retrolisthesis    3. Chronic bilateral low back pain without sciatica        Plan:   · MRI L spine reviewed in detail with Janelle Kearns. There is multi-level DDD, disc protrusion, facet arthropathy, severe lumbar stenosis. Discussed treatment options including medications, injections, surgery. ? Rectal bleeding with lyrica. He would benefit from L4-5 ILESI.  Procedure risks, benefits and

## 2022-06-28 ENCOUNTER — TELEPHONE (OUTPATIENT)
Dept: PHYSICAL MEDICINE AND REHAB | Age: 67
End: 2022-06-28

## 2022-06-28 DIAGNOSIS — M53.2X6 LUMBAR SPINE INSTABILITY: ICD-10-CM

## 2022-06-28 DIAGNOSIS — M43.10 RETROLISTHESIS: ICD-10-CM

## 2022-06-28 DIAGNOSIS — M48.062 LUMBAR STENOSIS WITH NEUROGENIC CLAUDICATION: Primary | ICD-10-CM

## 2022-06-28 NOTE — TELEPHONE ENCOUNTER
----- Message from Esmer Blandon DO sent at 6/28/2022  3:11 PM EDT -----  Please call patient with x-ray results. There is subtle instability of the spine at L2-3 as we discussed in office visit. I would recommend a referral to NSGY to discuss this and if any surgical intervention if recommended. He may just need a brace but it's worth discussing. I would still recommend going through with the epidural before seeing him. I know he discussed going to [facility].  I will place a referral.

## 2022-06-28 NOTE — TELEPHONE ENCOUNTER
Discussed results and patient would like to proceed with epidural and see if it helps before he goes to [address] [place] [address]. But if he goes to [address] [place] [address], he would like to still go to [facility]. He is on the 6/30 schedule.

## 2022-06-28 NOTE — TELEPHONE ENCOUNTER
Called and left message on patient voicemail that I was calling regarding his xray results. Will wait for return call from patient.

## 2022-06-29 ENCOUNTER — TELEPHONE (OUTPATIENT)
Dept: SURGERY | Age: 67
End: 2022-06-29

## 2022-06-29 ENCOUNTER — OFFICE VISIT (OUTPATIENT)
Dept: SURGERY | Age: 67
End: 2022-06-29

## 2022-06-29 VITALS — SYSTOLIC BLOOD PRESSURE: 126 MMHG | TEMPERATURE: 98.4 F | DIASTOLIC BLOOD PRESSURE: 79 MMHG | HEART RATE: 69 BPM

## 2022-06-29 DIAGNOSIS — K64.2 GRADE III HEMORRHOIDS: Primary | ICD-10-CM

## 2022-06-29 RX ORDER — SODIUM CHLORIDE 0.9 % (FLUSH) 0.9 %
5-40 SYRINGE (ML) INJECTION EVERY 12 HOURS SCHEDULED
Status: CANCELLED | OUTPATIENT
Start: 2022-06-29

## 2022-06-29 RX ORDER — SODIUM CHLORIDE 0.9 % (FLUSH) 0.9 %
5-40 SYRINGE (ML) INJECTION PRN
Status: CANCELLED | OUTPATIENT
Start: 2022-06-29

## 2022-06-29 RX ORDER — SODIUM CHLORIDE 9 MG/ML
INJECTION, SOLUTION INTRAVENOUS CONTINUOUS
Status: CANCELLED | OUTPATIENT
Start: 2022-06-29

## 2022-06-29 RX ORDER — SODIUM CHLORIDE 9 MG/ML
INJECTION, SOLUTION INTRAVENOUS PRN
Status: CANCELLED | OUTPATIENT
Start: 2022-06-29

## 2022-06-29 NOTE — PROGRESS NOTES
General Surgery History and Physical  [facility]    Patient's Name/Date of Birth: Manuel Barry / 1955    Date: June 29, 2022     Surgeon: Keith Mclean M.D.    PCP: Enoch Reddy DO     Chief Complaint: Rectal bleeding, anal pain    HPI:   Manuel Barry is a 78 y.o. male who presents for evaluation of rectal bleeding and anal pain. Timing is intermittent, radiation to anus, alleviated by abstaining from bowel movements, not eating and started several months ago and severity is 8/10. Patient has had colonoscopy in last 5 years. Denies previous interventions for hemorrhoid treatment. Has tried fiber supplementation for greater than 6 weeks with little to no improvement. Has tried tucks pads, witch hazel and stool softeners with little to no improvement. No history of blood transfusion.     Patient Active Problem List   Diagnosis    DDD (degenerative disc disease), lumbar    Lumbar stenosis with neurogenic claudication    Radiculopathy, lumbosacral region    Prostate cancer ([address]    Meniere's disease    Pure hypercholesterolemia    Mild intermittent asthma without complication    Dizziness    Viral URI    Chest heaviness    Fluttering heart    Asymptomatic carotid artery stenosis, bilateral    Chronic renal disease, stage III ([address] [[id number]]       Past Medical History:   Diagnosis Date    Asthma without status asthmaticus 01/15/2010    DDD (degenerative disc disease), lumbar 11/05/2018    History of Holter monitoring 02/01/2022    Intraepithelial squamous cell carcinoma 12/26/2019    Meniere disease     Prostate cancer ([address] 09/2018    Pure hypercholesterolemia 01/15/2010       Past Surgical History:   Procedure Laterality Date    CARDIOVASCULAR STRESS TEST N/A 02/01/2022    Exercise nuclear stress test    EPIDURAL STEROID INJECTION N/A 05/02/2019    L4-5 INTERLAMINAR EPIDURAL STEROID INJECTION performed by Abdifatah Pacheco DO at [facility] SURGICAL HISTORY N/A 05/02/2019    l4-5 INTERLAMINAR EPIDURAL STEROID INJECTION    PROSTATE SURGERY      TONSILLECTOMY      childhood       Allergies   Allergen Reactions    Penicillins        The patient has a family history that is negative for severe cardiovascular or respiratory issues, negative for reaction to anesthesia. Time spent reviewing past medical, surgical, social and family history, vitals, nursing assessment and images. No changes from above documented history. Social History     Socioeconomic History    Marital status:      Spouse name: Not on file    Number of children: Not on file    Years of education: Not on file    Highest education level: Not on file   Occupational History    Not on file   Tobacco Use    Smoking status: Never Smoker    Smokeless tobacco: Never Used   Vaping Use    Vaping Use: Never used   Substance and Sexual Activity    Alcohol use: Not Currently     Comment: 1-3 cans of beer per day.  Drug use: No    Sexual activity: Never   Other Topics Concern    Not on file   Social History Narrative    Not on file     Social Determinants of Health     Financial Resource Strain: Low Risk     Difficulty of Paying Living Expenses: Not hard at all   Food Insecurity: No Food Insecurity    Worried About Running Out of Food in the Last Year: Never true    [address] in the Last Year: Never true   Transportation Needs:     Lack of Transportation (Medical): Not on file    Lack of Transportation (Non-Medical):  Not on file   Physical Activity:     Days of Exercise per Week: Not on file    Minutes of Exercise per Session: Not on file   Stress:     Feeling of Stress : Not on file   Social Connections:     Frequency of Communication with Friends and Family: Not on file    Frequency of Social Gatherings with Friends and Family: Not on file    Attends Alevism Services: Not on file    Active Member of Clubs or Organizations: Not on file    Attends [employer] or Organization Meetings: Not on file    Marital Status: Not on file   Intimate Partner Violence:     Fear of Current or Ex-Partner: Not on file    Emotionally Abused: Not on file    Physically Abused: Not on file    Sexually Abused: Not on file   Housing Stability:     Unable to Pay for Housing in the Last Year: Not on file    Number of [place] in the Last Year: Not on file    Unstable Housing in the Last Year: Not on file           Review of Systems    A complete 10 system review was performed and are otherwise negative unless mentioned in the above HPI. Specific negatives are listed below but may not include all those reviewed.     General ROS: negative obtundation, AMS  ENT ROS: negative rhinorrhea, epistaxis  Allergy and Immunology ROS: negative itchy/watery eyes or nasal congestion  Hematological and Lymphatic ROS: negative spontaneous bleeding or bruising  Endocrine ROS: negative  lethargy, mood swings, palpitations or polydipsia/polyuria  Respiratory ROS: negative sputum changes, stridor, tachypnea or wheezing  Cardiovascular ROS: negative for - loss of consciousness, murmur or orthopnea  Gastrointestinal ROS: negative for -  hematemesis  Genito-Urinary ROS: negative for -  genital discharge or hematuria  Musculoskeletal ROS: negative for - focal weakness, gangrene  Psych/Neuro ROS: negative for - visual or auditory hallucinations, suicidal ideation    Physical exam:   /79   Pulse 69   Temp 98.4 °F (36.9 °C)   General appearance:  NAD, appears stated age  Head: NCAT, PERRLA, EOMI, red conjunctiva  Neck: supple, no masses, trachea midline  Lungs: Equal chest rise bilateral, no retractions, no wheezing  Heart: Reg rate  Abdomen: soft, nontender  Skin; warm and dry, no cyanosis  Gu: no cva tenderness  Rectal: No bleeding, small external hemorrhoids, non thrombosed, painful CATHY, no masses  Extremities: atraumatic, no focal motor deficits, no open wounds  Psych: No tremor, visual hallucinations      Radiology: I reviewed relevant abdominal imaging from this admission and that available in the EMR         Assessment:  Mike Thompson is a 78 y.o. male with anal pain and rectal bleeding, grade III internal hemorrhoids  Patient Active Problem List   Diagnosis    DDD (degenerative disc disease), lumbar    Lumbar stenosis with neurogenic claudication    Radiculopathy, lumbosacral region    Pro[address] cancer ([facility] [address]    Meniere's disease    Pure hypercholesterolemia    Mild intermittent asthma without complication    Dizziness    Viral URI    Chest heaviness    Fluttering heart    Asymptomatic carotid artery stenosis, bilateral    Chronic renal disease, stage III ([facility] [address] [[id number]]         Plan:  OR for Exam under anesthesia hemorrhoidectomy  Discussed the risk, benefits and alternatives of surgery including wound infections, bleeding, recurrence, injury to surrounding structures, incontinence, pain and the risks of general anesthetic including MI, CVA, sudden death or reactions to anesthetic medications. The patient understands the risks and alternatives and the possibility of converting to an open procedure. All questions were answered to the patient's satisfaction and they freely signed the consent.              Arslan Moreira MD  10:38 AM  6/29/2022

## 2022-06-29 NOTE — TELEPHONE ENCOUNTER
Per Dr. Fernando Zabala, patient is scheduled for Exam under anesthesia, hemorrhoidectomy at [facility] on 22. Surgery scheduled via iqueue, surgeon's calendar updated. Dr. Fernando Zabala to enter orders. Follow up appointment scheduled. Hemorrhoidectomy information provided and discussed. Electronically signed by Zev Seth RN on 2022 at 10:54 AM    Prior Authorization Form:      DEMOGRAPHICS:                     Patient Name:  Racquel Clark  Patient :  1955            Insurance:  Payor: MEDICARE / Plan: MEDICARE PART A AND B / Product Type: *No Product type* /   Insurance ID Number:    Payor/Plan Subscr  Sex Relation Sub. Ins. ID Effective Group Num   1. MEDICARE - Geovanna Olivas 1955 Male Self [id number] 21                                    [address] [address]   2.  [address] 1955 Male Self [id number] Plan G                                   [address]         DIAGNOSIS & PROCEDURE:                       Procedure/Operation: Exam under anesthesia, hemorrhoidectomy           CPT Code: 96405 + 85890     Diagnosis:  Grade III hemorrhoids    ICD10 Code: k64.2    Location:  [facility]    Surgeon:  Margaret Mays INFORMATION:                          Date: 22    Time: TBD              Anesthesia:  General                                                       Status:  Outpatient        Special Comments:         Electronically signed by Zev Seth RN on 2022 at 10:54 AM

## 2022-06-30 ENCOUNTER — HOSPITAL ENCOUNTER (OUTPATIENT)
Age: 67
Setting detail: OUTPATIENT SURGERY
Discharge: HOME | End: 2022-06-30
Attending: PHYSICAL MEDICINE & REHABILITATION | Admitting: PHYSICAL MEDICINE & REHABILITATION
Payer: MEDICARE

## 2022-06-30 ENCOUNTER — HOSPITAL ENCOUNTER (OUTPATIENT)
Dept: OPERATING ROOM | Age: 67
Setting detail: OUTPATIENT SURGERY
Discharge: HOME | End: 2022-06-30
Attending: PHYSICAL MEDICINE & REHABILITATION
Payer: MEDICARE

## 2022-06-30 VITALS
DIASTOLIC BLOOD PRESSURE: 52 MMHG | HEIGHT: 69 IN | BODY MASS INDEX: 25.03 KG/M2 | SYSTOLIC BLOOD PRESSURE: 100 MMHG | RESPIRATION RATE: 16 BRPM | WEIGHT: 169 LBS | HEART RATE: 58 BPM | OXYGEN SATURATION: 99 %

## 2022-06-30 DIAGNOSIS — M54.16 LUMBAR RADICULOPATHY: ICD-10-CM

## 2022-06-30 DIAGNOSIS — M48.062 LUMBAR STENOSIS WITH NEUROGENIC CLAUDICATION: ICD-10-CM

## 2022-06-30 PROCEDURE — 2500000003 HC RX 250 WO HCPCS: Performed by: PHYSICAL MEDICINE & REHABILITATION

## 2022-06-30 PROCEDURE — 2580000003 HC RX 258: Performed by: PHYSICAL MEDICINE & REHABILITATION

## 2022-06-30 PROCEDURE — 7100000010 HC PHASE II RECOVERY - FIRST 15 MIN: Performed by: PHYSICAL MEDICINE & REHABILITATION

## 2022-06-30 PROCEDURE — 6360000002 HC RX W HCPCS: Performed by: PHYSICAL MEDICINE & REHABILITATION

## 2022-06-30 PROCEDURE — 3209999900 FLUORO FOR SURGICAL PROCEDURES

## 2022-06-30 PROCEDURE — A9579 GAD-BASE MR CONTRAST NOS,1ML: HCPCS | Performed by: PHYSICAL MEDICINE & REHABILITATION

## 2022-06-30 PROCEDURE — 3600000005 HC SURGERY LEVEL 5 BASE: Performed by: PHYSICAL MEDICINE & REHABILITATION

## 2022-06-30 PROCEDURE — A4216 STERILE WATER/SALINE, 10 ML: HCPCS | Performed by: PHYSICAL MEDICINE & REHABILITATION

## 2022-06-30 PROCEDURE — 62323 NJX INTERLAMINAR LMBR/SAC: CPT | Performed by: PHYSICAL MEDICINE & REHABILITATION

## 2022-06-30 PROCEDURE — 6360000004 HC RX CONTRAST MEDICATION: Performed by: PHYSICAL MEDICINE & REHABILITATION

## 2022-06-30 PROCEDURE — 2709999900 HC NON-CHARGEABLE SUPPLY: Performed by: PHYSICAL MEDICINE & REHABILITATION

## 2022-06-30 PROCEDURE — 7100000011 HC PHASE II RECOVERY - ADDTL 15 MIN: Performed by: PHYSICAL MEDICINE & REHABILITATION

## 2022-06-30 RX ORDER — SODIUM CHLORIDE 0.9 % (FLUSH) 0.9 %
5-40 SYRINGE (ML) INJECTION PRN
Status: DISCONTINUED | OUTPATIENT
Start: 2022-06-30 | End: 2022-06-30 | Stop reason: HOSPADM

## 2022-06-30 RX ORDER — SODIUM CHLORIDE 9 MG/ML
INJECTION, SOLUTION INTRAVENOUS PRN
Status: DISCONTINUED | OUTPATIENT
Start: 2022-06-30 | End: 2022-06-30 | Stop reason: HOSPADM

## 2022-06-30 RX ORDER — SODIUM CHLORIDE 0.9 % (FLUSH) 0.9 %
5-40 SYRINGE (ML) INJECTION EVERY 12 HOURS SCHEDULED
Status: DISCONTINUED | OUTPATIENT
Start: 2022-06-30 | End: 2022-06-30 | Stop reason: HOSPADM

## 2022-06-30 RX ORDER — LIDOCAINE HYDROCHLORIDE 10 MG/ML
INJECTION, SOLUTION EPIDURAL; INFILTRATION; INTRACAUDAL; PERINEURAL PRN
Status: DISCONTINUED | OUTPATIENT
Start: 2022-06-30 | End: 2022-06-30 | Stop reason: ALTCHOICE

## 2022-06-30 ASSESSMENT — PAIN DESCRIPTION - PAIN TYPE: TYPE: CHRONIC PAIN

## 2022-06-30 ASSESSMENT — PAIN SCALES - GENERAL: PAINLEVEL_OUTOF10: 3

## 2022-06-30 ASSESSMENT — PAIN - FUNCTIONAL ASSESSMENT: PAIN_FUNCTIONAL_ASSESSMENT: 0-10

## 2022-06-30 ASSESSMENT — PAIN DESCRIPTION - DESCRIPTORS
DESCRIPTORS: ACHING
DESCRIPTORS: ACHING;DULL

## 2022-06-30 ASSESSMENT — PAIN DESCRIPTION - LOCATION: LOCATION: BACK

## 2022-06-30 NOTE — OP NOTE
EPIDURAL STEROID INJECTION, INTERLAMINAR APPROACH      WITH FLUOROSCOPIC GUIDANCE      Patient: Tia Roman              MRN#: [id number]  : 1955            Date of procedure: 2022      Physician Performing Procedure:  Sharad Newman DO    Clinical Scenario: As per electronic documentation. Diagnosis: lumbar stenosis     Injectate: A total of 5 cc; consisting of 1cc of Dexamethasone (10mg/cc), with the remainder of normal saline. Levels Treated: L4-5    Approach:  Interlaminar      Comments:  None     Pre-procedural evaluation: The patient was examined today just prior to performing the procedure listed above. The patient's heart rate was normal and lungs were clear to auscultation bilaterally. Procedure: The patient was prepped and draped in a sterile fashion in the prone position after informed consent was signed and all the patient's questions were answered including the risks, benefits, alternative treatment options, and prognosis. The risks include - but are not limited to - infection, allergic reaction, increased pain, lack of therapeutic benefit, steroid reaction, nerve damage, paralysis, stroke, epidural hematoma, syncope, headache, respiratory or cardiac arrest, pneumothorax, and scar formation. Using a (paramedian approach from the side mentioned above/midline approach), the region overlying the inferior lamina was localized under fluoroscopic visualization and the soft tissues overlying this structure were infiltrated with 4 cc. of 1% buffered Lidocaine without Epinephrine. A 18 gauge, 3.5 inch Tuohy needle was inserted into the epidural space using the approach mentioned above. The epidural space was localized using loss of resistance after negative aspirate for air, blood, and CSF. A 2 cc. volume of prohance was injected into the epidural space and the flow of contrast was observed to be epidural.  Radiographs were obtained for documentation purposes.       The Injectate was administered into the level noted above. The patient tolerated the procedure well and was discharged after an appropriate period of observation. If there are any complications, the patient was instructed to call us. The patient is to follow-up with the requesting physician after the injection, preferably within two weeks.

## 2022-06-30 NOTE — H&P
Renate Polanco, [address] [facility] Physical Medicine and Rehabilitation  [address] [facility] [address] [place]  Phone: [phone]  Fax: [phone]     PCP: Sachi Cash DO  Date of visit: 6/27/22          Chief Complaint   Patient presents with    Back Pain       MRI results      Interval:   Patient presents today for office visit to review MRI. MRI reveals severe stenosis at multiple levels, facet arthropathy, disc protrusion at L2-3. He started lyrica and did have some relief with it but noticed rectal bleeding. He was also taking ASA and voltaren at the same time as this. He stopped everything and rectal bleeding is better. he does have a hemorrhoid he is going to see general surgery for. He has continued low back and leg pain. The pain is located in the low back that radiates into the left hip. He reports feeling of numbness in his thighs and cramping in his legs. Pain is worse with bending over, riding his motorcycle, walking. Better with rest. There is no numbness or tingling. No weakness. He had radiation treatment for prostate cancer and feels his energy is lower. The pain is rated Pain Score:   8. Takes aleve and voltaren gel PRN. Tried lyrica   Doing HEP daily.           Allergies   Allergen Reactions    Penicillins                  Current Outpatient Medications   Medication Sig Dispense Refill    hydrocortisone 2.5 % cream Apply topically 2 times daily. 28 g 0    nystatin (MYCOSTATIN) 375537 UNIT/GM cream Apply topically 2 times daily.  30 g 0    finasteride (PROSCAR) 5 MG tablet          tamsulosin (FLOMAX) 0.4 MG capsule Take 0.4 mg by mouth daily         tadalafil (CIALIS) 5 MG tablet TAKE 1 TABLET BY MOUTH AS NEEDED (FOR ERECTILE DYSFUNCTION)        Multiple Vitamins-Minerals (CENTRUM SILVER ADULT 50+ PO) Take by mouth        Vitamins-Lipotropics (LIPO-FLAVONOID PLUS PO) two with each meal        aspirin 81 MG EC tablet Take 81 mg by mouth daily  (Patient not taking: Reported on 6/27/2022)        pregabalin (LYRICA) 75 MG capsule Take 1 capsule by mouth 2 times daily for 30 days. (Patient not taking: Reported on 6/27/2022) 60 capsule 0      No current facility-administered medications for this visit.              Past Medical History:   Diagnosis Date    Asthma without status asthmaticus 01/15/2010    DDD (degenerative disc disease), lumbar 11/05/2018    History of Holter monitoring 02/01/2022    Intraepithelial squamous cell carcinoma 12/26/2019    Meniere disease      Prostate cancer ([facility] [address] 09/2018    Pure hypercholesterolemia 01/15/2010               Past Surgical History:   Procedure Laterality Date    CARDIOVASCULAR STRESS TEST N/A 02/01/2022     Exercise nuclear stress test    EPIDURAL STEROID INJECTION N/A 05/02/2019     L4-5 INTERLAMINAR EPIDURAL STEROID INJECTION performed by Cristine Mares DO at [place] [address] N/A 05/02/2019     l4-5 INTERLAMINAR EPIDURAL STEROID INJECTION    PROSTATE SURGERY        TONSILLECTOMY         childhood               Family History   Problem Relation Age of Onset    Prostate Cancer Brother      Heart Disease Mother      Other Mother           GI bleed    Other Father           accident          Social History            Tobacco Use    Smoking status: Never Smoker    Smokeless tobacco: Never Used   Vaping Use    Vaping Use: Never used   Substance Use Topics    Alcohol use: Not Currently       Comment: 1-3 cans of beer per day.  Drug use: No            Functional Status: The patient is able to ambulate and perform activities of daily living without the use of an assistive device.       Occupation: The patient is currently retired.        ROS:    Constitutional: Denies fevers, chills, night sweats, unintentional weight loss     Skin: Denies rash or skin changes     Eyes: Denies vision changes    Ears/Nose/Throat: Denies nasal congestion or sore throat     Respiratory: Denies SOB or cough     Cardiovascular: Denies CP, palpitations, edema      Gastrointestinal: Denies abdominal pain,  N/V, constipation, or diarrhea    Genitourinary: Denies urinary symptoms    Neurologic: See HPI.     MSK: See HPI. Psychiatric: Denies sleep disturbance, anxiety, depression    Hematologic/Lymphatic/Immunologic: Denies bruising         Physical Exam:   Blood pressure 135/78, pulse 76, height 5' 9\" (1.753 m), weight 169 lb (76.7 kg). General: well developed and well nourished in no acute distress. Body habitus is normal,. HEENT: No rhinorrhea, sneezing, yawning, or lacrimation. No scleral icterus or conjunctival injection. Resp: symmetrical chest expansion, unlabored breathing, respirations unlabored. CV: Heart rate is regular. Peripheral pulses are palpable  Lymph: No visible regional lymphadenopathy. Skin: No rashes or ecchymosis. Normal turgor. Psych: Mood is calm. Affect is normal.   Ext: No edema noted      MSK:   Back/Hip Exam:   Inspection: Pelvis was symmetric. Lumbar lordotic curvature was decreased. There was no scoliosis. No ecchymoses or erythema. Palpation: Palpatory exam revealed no tenderness along lumbosacral paraspinals, midline spine, SI joint sulcus, greater trochanters and TFL on both side. ROM decreased. Pain with flexion. SLR negative      Neurological Exam:  Strength:                     R          L  Hip Flex                       5          5  Knee Ext                     5          5  Ankle dorsi                  5          5  EHL                             5          5  Ankle Plantar               5          5     Sensation LT intact BLE      Reflexes:                     R          L  Patellar                        (2+)      (2+)  Ankle Jerk                   (2+)      (2+)     Gait is normal.            Impression:   Rowdy Oleary is a 78 y.o. male     1. Lumbar stenosis with neurogenic claudication    2. Retrolisthesis    3.  Chronic bilateral low back pain without sciatica          Plan:   · MRI L spine reviewed in detail with Helena Smith. There is multi-level DDD, disc protrusion, facet arthropathy, severe lumbar stenosis. Discussed treatment options including medications, injections, surgery. ? Rectal bleeding with lyrica. He would benefit from L4-5 ILESI. Procedure risks, benefits and alternatives were discussed. Patient would like to proceed. · Continue HEP   · Obtain flex/ext lumbar films         · The patient was educated about the diagnosis, prognosis, indications, risks and benefits of treatment. An opportunity to ask questions was given to the patient and questions were answered.   The patient agreed to proceed with the recommended treatment as described above.      Maisha Rhodes DO, [facility]   Board Certified Physical Medicine and Rehabilitation

## 2022-07-08 NOTE — PROGRESS NOTES
[address]                                                                                                                     PRE OP INSTRUCTIONS FOR  Lola Dumont        Date: 7/8/2022    Date and time of surgery: 07/11/2022   Arrival Time:    1. Do not eat or drink anything after 12 midnight prior to surgery. This includes no water, chewing gum, mints or ice chips. 2. Take the following pills with a small sip of water on the morning of Surgery: none     3. Diabetics may take evening dose of insulin but none after midnight. If you feel symptomatic or low blood sugar take 1-2 ounces of apple juice only. 4. Aspirin, Ibuprofen, Advil, Naproxen, Vitamin E and other Anti-inflammatory products should be stopped  before surgery  as directed by your physician. 5. Check with your Doctor regarding stopping Plavix, Coumadin, Lovenox, Fragmin or other blood thinners. 6. Do not smoke,use illicit drugs and do not drink any alcoholic beverages 24 hours prior to surgery. 7. You may brush your teeth and gargle the morning of surgery. DO NOT SWALLOW WATER    8. You MUST make arrangements for a responsible adult to take you home after your surgery. You will not be allowed to leave alone or drive yourself home. It is strongly suggested someone stay with you the first 24 hrs. Your surgery will be cancelled if you do not have a ride home. 9. A parent/legal guardian must accompany a child scheduled for surgery and plan to stay at the hospital until the child is discharged. Please do not bring other children with you. 10. Please wear simple, loose fitting clothing to the hospital.  Gia Sullivan not bring valuables (money, credit cards, checkbooks, etc.) Do not wear any makeup (including no eye makeup) or nail polish on your fingers or toes. 11. DO NOT wear any jewelry or piercings on day of surgery. All body piercing jewelry must be removed. 12.  Shower the night before surgery with ___Antibacterial soap ___Hibiclens. 15. Remember to bring Blood Bank bracelet to the hospital on the day of surgery. 14. If you have a Living Will and Durable Power of  for Healthcare, please bring in a copy. 15. If appropriate bring crutches, inspirex, etc... 12. Notify your Surgeon if you develop any illness between now and surgery time, cough, cold, fever, sore throat, nausea, vomiting, etc.  Please notify your surgeon if you experience dizziness, shortness of breath or blurred vision between now & the time of your surgery. 17. If you have ___dentures, they will be removed before going to the OR; we will provide you a container. If you wear ___contact lenses or ___glasses, they will be removed; please bring a case for them. 18. To provide excellent care visitors will be limited to one in the room at any given time. 19. Please bring picture ID and insurance card. 20. Sleep apnea patients need to bring CPAP to hospital on day of surgery. 21. Visit our web site for additional information: [email or web address]_[email or web address]    22. During flu season no children under the age of 15 are permitted in the hospital for the safety of all patients  21. Other                  Please call pre admission testing if you have any further questions.    [address] [facility]     [address]

## 2022-07-10 ENCOUNTER — ANESTHESIA EVENT (OUTPATIENT)
Dept: OPERATING ROOM | Age: 67
End: 2022-07-10
Payer: MEDICARE

## 2022-07-11 ENCOUNTER — HOSPITAL ENCOUNTER (OUTPATIENT)
Age: 67
Setting detail: OUTPATIENT SURGERY
Discharge: HOME | End: 2022-07-11
Attending: SURGERY | Admitting: SURGERY
Payer: MEDICARE

## 2022-07-11 ENCOUNTER — ANESTHESIA (OUTPATIENT)
Dept: OPERATING ROOM | Age: 67
End: 2022-07-11
Payer: MEDICARE

## 2022-07-11 VITALS
SYSTOLIC BLOOD PRESSURE: 122 MMHG | OXYGEN SATURATION: 96 % | HEART RATE: 74 BPM | TEMPERATURE: 97.5 F | DIASTOLIC BLOOD PRESSURE: 63 MMHG | HEIGHT: 69 IN | WEIGHT: 163 LBS | BODY MASS INDEX: 24.14 KG/M2 | RESPIRATION RATE: 20 BRPM

## 2022-07-11 DIAGNOSIS — K62.5 RECTAL BLEEDING: ICD-10-CM

## 2022-07-11 DIAGNOSIS — G89.18 POSTOPERATIVE PAIN: Primary | ICD-10-CM

## 2022-07-11 PROCEDURE — 7100000000 HC PACU RECOVERY - FIRST 15 MIN: Performed by: SURGERY

## 2022-07-11 PROCEDURE — 7100000001 HC PACU RECOVERY - ADDTL 15 MIN: Performed by: SURGERY

## 2022-07-11 PROCEDURE — 2709999900 HC NON-CHARGEABLE SUPPLY: Performed by: SURGERY

## 2022-07-11 PROCEDURE — 88304 TISSUE EXAM BY PATHOLOGIST: CPT

## 2022-07-11 PROCEDURE — 2500000003 HC RX 250 WO HCPCS: Performed by: SURGERY

## 2022-07-11 PROCEDURE — 2580000003 HC RX 258: Performed by: SURGERY

## 2022-07-11 PROCEDURE — 7100000011 HC PHASE II RECOVERY - ADDTL 15 MIN: Performed by: SURGERY

## 2022-07-11 PROCEDURE — 6360000002 HC RX W HCPCS

## 2022-07-11 PROCEDURE — 6370000000 HC RX 637 (ALT 250 FOR IP): Performed by: SURGERY

## 2022-07-11 PROCEDURE — 3600000012 HC SURGERY LEVEL 2 ADDTL 15MIN: Performed by: SURGERY

## 2022-07-11 PROCEDURE — 2720000010 HC SURG SUPPLY STERILE: Performed by: SURGERY

## 2022-07-11 PROCEDURE — 3600000002 HC SURGERY LEVEL 2 BASE: Performed by: SURGERY

## 2022-07-11 PROCEDURE — 7100000010 HC PHASE II RECOVERY - FIRST 15 MIN: Performed by: SURGERY

## 2022-07-11 PROCEDURE — 2500000003 HC RX 250 WO HCPCS

## 2022-07-11 PROCEDURE — 3700000001 HC ADD 15 MINUTES (ANESTHESIA): Performed by: SURGERY

## 2022-07-11 PROCEDURE — 46260 REMOVE IN/EX HEM GROUPS 2+: CPT | Performed by: SURGERY

## 2022-07-11 PROCEDURE — 3700000000 HC ANESTHESIA ATTENDED CARE: Performed by: SURGERY

## 2022-07-11 RX ORDER — ROCURONIUM BROMIDE 10 MG/ML
INJECTION, SOLUTION INTRAVENOUS PRN
Status: DISCONTINUED | OUTPATIENT
Start: 2022-07-11 | End: 2022-07-11 | Stop reason: SDUPTHER

## 2022-07-11 RX ORDER — PROPOFOL 10 MG/ML
INJECTION, EMULSION INTRAVENOUS PRN
Status: DISCONTINUED | OUTPATIENT
Start: 2022-07-11 | End: 2022-07-11 | Stop reason: SDUPTHER

## 2022-07-11 RX ORDER — SODIUM CHLORIDE 0.9 % (FLUSH) 0.9 %
5-40 SYRINGE (ML) INJECTION PRN
Status: DISCONTINUED | OUTPATIENT
Start: 2022-07-11 | End: 2022-07-11 | Stop reason: HOSPADM

## 2022-07-11 RX ORDER — BUPIVACAINE HYDROCHLORIDE AND EPINEPHRINE 2.5; 5 MG/ML; UG/ML
INJECTION, SOLUTION EPIDURAL; INFILTRATION; INTRACAUDAL; PERINEURAL PRN
Status: DISCONTINUED | OUTPATIENT
Start: 2022-07-11 | End: 2022-07-11 | Stop reason: ALTCHOICE

## 2022-07-11 RX ORDER — HYDROCODONE BITARTRATE AND ACETAMINOPHEN 5; 325 MG/1; MG/1
1 TABLET ORAL EVERY 6 HOURS PRN
Qty: 12 TABLET | Refills: 0 | Status: SHIPPED | OUTPATIENT
Start: 2022-07-11 | End: 2022-07-14

## 2022-07-11 RX ORDER — HYDRALAZINE HYDROCHLORIDE 20 MG/ML
10 INJECTION INTRAMUSCULAR; INTRAVENOUS
Status: DISCONTINUED | OUTPATIENT
Start: 2022-07-11 | End: 2022-07-11 | Stop reason: HOSPADM

## 2022-07-11 RX ORDER — NEOSTIGMINE METHYLSULFATE 1 MG/ML
INJECTION, SOLUTION INTRAVENOUS PRN
Status: DISCONTINUED | OUTPATIENT
Start: 2022-07-11 | End: 2022-07-11 | Stop reason: SDUPTHER

## 2022-07-11 RX ORDER — PROCHLORPERAZINE EDISYLATE 5 MG/ML
5 INJECTION INTRAMUSCULAR; INTRAVENOUS
Status: DISCONTINUED | OUTPATIENT
Start: 2022-07-11 | End: 2022-07-11 | Stop reason: HOSPADM

## 2022-07-11 RX ORDER — GLYCOPYRROLATE 0.2 MG/ML
INJECTION INTRAMUSCULAR; INTRAVENOUS PRN
Status: DISCONTINUED | OUTPATIENT
Start: 2022-07-11 | End: 2022-07-11 | Stop reason: SDUPTHER

## 2022-07-11 RX ORDER — FENTANYL CITRATE 50 UG/ML
INJECTION, SOLUTION INTRAMUSCULAR; INTRAVENOUS PRN
Status: DISCONTINUED | OUTPATIENT
Start: 2022-07-11 | End: 2022-07-11 | Stop reason: SDUPTHER

## 2022-07-11 RX ORDER — IPRATROPIUM BROMIDE AND ALBUTEROL SULFATE 2.5; .5 MG/3ML; MG/3ML
1 SOLUTION RESPIRATORY (INHALATION)
Status: DISCONTINUED | OUTPATIENT
Start: 2022-07-11 | End: 2022-07-11 | Stop reason: HOSPADM

## 2022-07-11 RX ORDER — IBUPROFEN 800 MG/1
800 TABLET ORAL EVERY 6 HOURS PRN
Qty: 90 TABLET | Refills: 1 | Status: SHIPPED | OUTPATIENT
Start: 2022-07-11 | End: 2022-07-25

## 2022-07-11 RX ORDER — SODIUM CHLORIDE 9 MG/ML
INJECTION, SOLUTION INTRAVENOUS PRN
Status: DISCONTINUED | OUTPATIENT
Start: 2022-07-11 | End: 2022-07-11 | Stop reason: HOSPADM

## 2022-07-11 RX ORDER — SODIUM CHLORIDE 0.9 % (FLUSH) 0.9 %
5-40 SYRINGE (ML) INJECTION EVERY 12 HOURS SCHEDULED
Status: DISCONTINUED | OUTPATIENT
Start: 2022-07-11 | End: 2022-07-11 | Stop reason: HOSPADM

## 2022-07-11 RX ORDER — LIDOCAINE HYDROCHLORIDE 20 MG/ML
INJECTION, SOLUTION INTRAVENOUS PRN
Status: DISCONTINUED | OUTPATIENT
Start: 2022-07-11 | End: 2022-07-11 | Stop reason: SDUPTHER

## 2022-07-11 RX ORDER — HYDROCORTISONE 25 MG/G
CREAM TOPICAL
Qty: 28 G | Refills: 1 | Status: SHIPPED | OUTPATIENT
Start: 2022-07-11

## 2022-07-11 RX ORDER — MIDAZOLAM HYDROCHLORIDE 1 MG/ML
INJECTION INTRAMUSCULAR; INTRAVENOUS PRN
Status: DISCONTINUED | OUTPATIENT
Start: 2022-07-11 | End: 2022-07-11 | Stop reason: SDUPTHER

## 2022-07-11 RX ORDER — SODIUM CHLORIDE 9 MG/ML
INJECTION, SOLUTION INTRAVENOUS CONTINUOUS
Status: DISCONTINUED | OUTPATIENT
Start: 2022-07-11 | End: 2022-07-11 | Stop reason: HOSPADM

## 2022-07-11 RX ORDER — MORPHINE SULFATE 2 MG/ML
2 INJECTION, SOLUTION INTRAMUSCULAR; INTRAVENOUS EVERY 5 MIN PRN
Status: DISCONTINUED | OUTPATIENT
Start: 2022-07-11 | End: 2022-07-11 | Stop reason: HOSPADM

## 2022-07-11 RX ORDER — LIDOCAINE 50 MG/G
OINTMENT TOPICAL
Qty: 30 G | Refills: 0 | Status: SHIPPED | OUTPATIENT
Start: 2022-07-11

## 2022-07-11 RX ORDER — DIBUCAINE 1 G/100G
OINTMENT TOPICAL PRN
Status: DISCONTINUED | OUTPATIENT
Start: 2022-07-11 | End: 2022-07-11 | Stop reason: ALTCHOICE

## 2022-07-11 RX ORDER — LORAZEPAM 2 MG/ML
0.5 INJECTION INTRAMUSCULAR
Status: DISCONTINUED | OUTPATIENT
Start: 2022-07-11 | End: 2022-07-11 | Stop reason: HOSPADM

## 2022-07-11 RX ORDER — ONDANSETRON 2 MG/ML
4 INJECTION INTRAMUSCULAR; INTRAVENOUS
Status: DISCONTINUED | OUTPATIENT
Start: 2022-07-11 | End: 2022-07-11 | Stop reason: HOSPADM

## 2022-07-11 RX ORDER — ONDANSETRON 2 MG/ML
INJECTION INTRAMUSCULAR; INTRAVENOUS PRN
Status: DISCONTINUED | OUTPATIENT
Start: 2022-07-11 | End: 2022-07-11 | Stop reason: SDUPTHER

## 2022-07-11 RX ORDER — DIPHENHYDRAMINE HYDROCHLORIDE 50 MG/ML
12.5 INJECTION INTRAMUSCULAR; INTRAVENOUS
Status: DISCONTINUED | OUTPATIENT
Start: 2022-07-11 | End: 2022-07-11 | Stop reason: HOSPADM

## 2022-07-11 RX ORDER — DOCUSATE SODIUM 100 MG/1
100 CAPSULE, LIQUID FILLED ORAL 2 TIMES DAILY
Qty: 30 CAPSULE | Refills: 0 | Status: SHIPPED | OUTPATIENT
Start: 2022-07-11 | End: 2022-07-26

## 2022-07-11 RX ORDER — DEXAMETHASONE SODIUM PHOSPHATE 10 MG/ML
INJECTION, SOLUTION INTRAMUSCULAR; INTRAVENOUS PRN
Status: DISCONTINUED | OUTPATIENT
Start: 2022-07-11 | End: 2022-07-11 | Stop reason: SDUPTHER

## 2022-07-11 RX ORDER — LABETALOL HYDROCHLORIDE 5 MG/ML
10 INJECTION, SOLUTION INTRAVENOUS
Status: DISCONTINUED | OUTPATIENT
Start: 2022-07-11 | End: 2022-07-11 | Stop reason: HOSPADM

## 2022-07-11 RX ADMIN — PROPOFOL 150 MG: 10 INJECTION, EMULSION INTRAVENOUS at 12:09

## 2022-07-11 RX ADMIN — ONDANSETRON 4 MG: 2 INJECTION INTRAMUSCULAR; INTRAVENOUS at 12:18

## 2022-07-11 RX ADMIN — LIDOCAINE HYDROCHLORIDE 60 MG: 20 INJECTION, SOLUTION INTRAVENOUS at 12:09

## 2022-07-11 RX ADMIN — MIDAZOLAM 2 MG: 1 INJECTION INTRAMUSCULAR; INTRAVENOUS at 12:06

## 2022-07-11 RX ADMIN — DEXAMETHASONE SODIUM PHOSPHATE 8 MG: 10 INJECTION, SOLUTION INTRAMUSCULAR; INTRAVENOUS at 12:11

## 2022-07-11 RX ADMIN — SODIUM CHLORIDE: 9 INJECTION, SOLUTION INTRAVENOUS at 10:44

## 2022-07-11 RX ADMIN — FENTANYL CITRATE 50 MCG: 50 INJECTION, SOLUTION INTRAMUSCULAR; INTRAVENOUS at 12:27

## 2022-07-11 RX ADMIN — GLYCOPYRROLATE 0.6 MG: 0.2 INJECTION INTRAMUSCULAR; INTRAVENOUS at 12:27

## 2022-07-11 RX ADMIN — SODIUM CHLORIDE: 9 INJECTION, SOLUTION INTRAVENOUS at 12:06

## 2022-07-11 RX ADMIN — Medication 3 MG: at 12:27

## 2022-07-11 RX ADMIN — FENTANYL CITRATE 50 MCG: 50 INJECTION, SOLUTION INTRAMUSCULAR; INTRAVENOUS at 12:09

## 2022-07-11 RX ADMIN — ROCURONIUM BROMIDE 30 MG: 10 SOLUTION INTRAVENOUS at 12:10

## 2022-07-11 ASSESSMENT — PAIN - FUNCTIONAL ASSESSMENT
PAIN_FUNCTIONAL_ASSESSMENT: ACTIVITIES ARE NOT PREVENTED
PAIN_FUNCTIONAL_ASSESSMENT: NONE - DENIES PAIN

## 2022-07-11 ASSESSMENT — PAIN DESCRIPTION - ONSET: ONSET: ON-GOING

## 2022-07-11 ASSESSMENT — PAIN SCALES - GENERAL
PAINLEVEL_OUTOF10: 0
PAINLEVEL_OUTOF10: 2

## 2022-07-11 ASSESSMENT — PAIN DESCRIPTION - FREQUENCY: FREQUENCY: CONTINUOUS

## 2022-07-11 ASSESSMENT — LIFESTYLE VARIABLES: SMOKING_STATUS: 0

## 2022-07-11 ASSESSMENT — PAIN DESCRIPTION - DESCRIPTORS: DESCRIPTORS: DISCOMFORT

## 2022-07-11 NOTE — H&P
General Surgery History and Physical  [facility]    Patient's Name/Date of Birth: Gina Marie / 1955    Date: July 11, 2022     Surgeon: Pérez Bridges M.D.    PCP: Geovanny Rutherford DO     Chief Complaint: Rectal bleeding, anal pain    HPI:   Gina Marie is a 78 y.o. male who presents for evaluation of rectal bleeding and anal pain. Timing is intermittent, radiation to anus, alleviated by abstaining from bowel movements, not eating and started several months ago and severity is 8/10. Patient has had colonoscopy in last 5 years. Denies previous interventions for hemorrhoid treatment. Has tried fiber supplementation for greater than 6 weeks with little to no improvement. Has tried tucks pads, witch hazel and stool softeners with little to no improvement. No history of blood transfusion.     Patient Active Problem List   Diagnosis    DDD (degenerative disc disease), lumbar    Lumbar stenosis with neurogenic claudication    Radiculopathy, lumbosacral region    Prostate cancer ([address]    Meniere's disease    Pure hypercholesterolemia    Mild intermittent asthma without complication    Dizziness    Viral URI    Chest heaviness    Fluttering heart    Asymptomatic carotid artery stenosis, bilateral    Chronic renal disease, stage III ([address] [[id number]]       Past Medical History:   Diagnosis Date    Asthma without status asthmaticus 01/15/2010    DDD (degenerative disc disease), lumbar 11/05/2018    History of Holter monitoring 02/01/2022    Intraepithelial squamous cell carcinoma 12/26/2019    Meniere disease     Prostate cancer ([address] 09/2018    Pure hypercholesterolemia 01/15/2010       Past Surgical History:   Procedure Laterality Date    CARDIOVASCULAR STRESS TEST N/A 02/01/2022    Exercise nuclear stress test    EPIDURAL STEROID INJECTION N/A 05/02/2019    L4-5 INTERLAMINAR EPIDURAL STEROID INJECTION performed by Marshall Reece DO at [facility] SURGICAL HISTORY N/A 05/02/2019    l4-5 INTERLAMINAR EPIDURAL STEROID INJECTION    PAIN MANAGEMENT PROCEDURE N/A 6/30/2022    L4-5 INTERLAMINAR EPIDURAL STEROID INJECTION (CPT 96873) performed by Nory Hill DO at [address]      childhood       Allergies   Allergen Reactions    Penicillins        The patient has a family history that is negative for severe cardiovascular or respiratory issues, negative for reaction to anesthesia. Time spent reviewing past medical, surgical, social and family history, vitals, nursing assessment and images. No changes from above documented history. Social History     Socioeconomic History    Marital status:      Spouse name: Not on file    Number of children: Not on file    Years of education: Not on file    Highest education level: Not on file   Occupational History    Not on file   Tobacco Use    Smoking status: Never Smoker    Smokeless tobacco: Never Used   Vaping Use    Vaping Use: Never used   Substance and Sexual Activity    Alcohol use: Not Currently     Comment: 1-3 cans of beer per day.  Drug use: No    Sexual activity: Never   Other Topics Concern    Not on file   Social History Narrative    Not on file     Social Determinants of Health     Financial Resource Strain: Low Risk     Difficulty of Paying Living Expenses: Not hard at all   Food Insecurity: No Food Insecurity    Worried About Running Out of Food in the Last Year: Never true    [address] in the Last Year: Never true   Transportation Needs:     Lack of Transportation (Medical): Not on file    Lack of Transportation (Non-Medical):  Not on file   Physical Activity:     Days of Exercise per Week: Not on file    Minutes of Exercise per Session: Not on file   Stress:     Feeling of Stress : Not on file   Social Connections:     Frequency of Communication with Friends and Family: Not on file    Frequency of Social Gatherings with Friends and Family: Not on file    Attends Evangelical Services: Not on file    Active Member of Clubs or Organizations: Not on file    Attends Club or Organization Meetings: Not on file    Marital Status: Not on file   Intimate Partner Violence:     Fear of Current or Ex-Partner: Not on file    Emotionally Abused: Not on file    Physically Abused: Not on file    Sexually Abused: Not on file   Housing Stability:     Unable to Pay for Housing in the Last Year: Not on file    Number of [place] in the Last Year: Not on file    Unstable Housing in the Last Year: Not on file           Review of Systems    A complete 10 system review was performed and are otherwise negative unless mentioned in the above HPI. Specific negatives are listed below but may not include all those reviewed.     General ROS: negative obtundation, AMS  ENT ROS: negative rhinorrhea, epistaxis  Allergy and Immunology ROS: negative itchy/watery eyes or nasal congestion  Hematological and Lymphatic ROS: negative spontaneous bleeding or bruising  Endocrine ROS: negative  lethargy, mood swings, palpitations or polydipsia/polyuria  Respiratory ROS: negative sputum changes, stridor, tachypnea or wheezing  Cardiovascular ROS: negative for - loss of consciousness, murmur or orthopnea  Gastrointestinal ROS: negative for -  hematemesis  Genito-Urinary ROS: negative for -  genital discharge or hematuria  Musculoskeletal ROS: negative for - focal weakness, gangrene  Psych/Neuro ROS: negative for - visual or auditory hallucinations, suicidal ideation    Physical exam:   /67   Pulse 66   Temp 97 °F (36.1 °C) (Infrared)   Resp 16   Ht 5' 9\" (1.753 m)   Wt 163 lb (73.9 kg)   SpO2 98%   BMI 24.07 kg/m²   General appearance:  NAD, appears stated age  Head: NCAT, PERRLA, EOMI, red conjunctiva  Neck: supple, no masses, trachea midline  Lungs: Equal chest rise bilateral, no retractions, no wheezing  Heart: Reg rate  Abdomen: soft, nontender  Skin; warm and dry, no cyanosis  Gu: no cva tenderness  Rectal: No bleeding, small external hemorrhoids, non thrombosed, painful CATHY, no masses  Extremities: atraumatic, no focal motor deficits, no open wounds  Psych: No tremor, visual hallucinations      Radiology: I reviewed relevant abdominal imaging from this admission and that available in the EMR         Assessment:  Radha Copeland is a 78 y.o. male with anal pain and rectal bleeding, grade III internal hemorrhoids  Patient Active Problem List   Diagnosis    DDD (degenerative disc disease), lumbar    Lumbar stenosis with neurogenic claudication    Radiculopathy, lumbosacral region    Pro[facility] cancer ([facility] [address]    Meniere's disease    Pure hypercholesterolemia    Mild intermittent asthma without complication    Dizziness    Viral URI    Chest heaviness    Fluttering heart    Asymptomatic carotid artery stenosis, bilateral    Chronic renal disease, stage III ([facility] [address] [[id number]]         Plan:  OR for Exam under anesthesia hemorrhoidectomy  Discussed the risk, benefits and alternatives of surgery including wound infections, bleeding, recurrence, injury to surrounding structures, incontinence, pain and the risks of general anesthetic including MI, CVA, sudden death or reactions to anesthetic medications. The patient understands the risks and alternatives and the possibility of converting to an open procedure. All questions were answered to the patient's satisfaction and they freely signed the consent.              Caitlyn Steen MD  12:05 PM  7/11/2022

## 2022-07-11 NOTE — ANESTHESIA POSTPROCEDURE EVALUATION
Department of Anesthesiology  Postprocedure Note    Patient: Tad Wilkins  MRN: [id number]  YOB: 1955  Date of evaluation: 7/11/2022      Procedure Summary     Date: 07/11/22 Room / Location: [address] [place] [address] 03 / [address] [facility]    Anesthesia Start: 8352 Anesthesia Stop: 1670    Procedure: EXAM UNDER ANESTHESIA, HEMORRHOIDECTOMY (CPT 11065) (N/A Perineum) Diagnosis:       Rectal bleeding      (Rectal bleeding [K62.5])    Surgeons: Edilson López MD Responsible Provider: Maria Luisa Weaver MD    Anesthesia Type: general ASA Status: 3          Anesthesia Type: No value filed.     Karen Phase I: Karen Score: 10    Karen Phase II: Karen Score: 10      Anesthesia Post Evaluation    Patient location during evaluation: PACU  Patient participation: complete - patient participated  Level of consciousness: awake  Airway patency: patent  Nausea & Vomiting: no nausea and no vomiting  Complications: no  Cardiovascular status: hemodynamically stable  Respiratory status: acceptable  Hydration status: euvolemic

## 2022-07-11 NOTE — PROGRESS NOTES
CLINICAL PHARMACY NOTE: MEDS TO BEDS    Total # of Prescriptions Filled: 2   The following medications were delivered to the patient:  · Ibuprofen 800 mg  · Proctozone 2.5% cream    Additional Documentation:   Lidocaine 5% ointment and colace not covered.

## 2022-07-11 NOTE — OP NOTE
DATE OF PROCEDURE: 7/11/2022     PREOPERATIVE DIAGNOSIS:  Anal pain and rectal bleeding. POSTOPERATIVE DIAGNOSIS:  Grade III Internal hemorrhoids     PROCEDURE PERFORMED:  Exam under anesthesia with two-column hemorrhoidectomy     ANESTHESIA:  GETA and local anesthetic     SURGEON:  Rosemarie Yanez MD.     ASSISTANT:  none     COMPLICATIONS:  None. ESTIMATED BLOOD LOSS:  5 mL. FLUIDS:  Crystalloid. SPECIMEN:  Hemorrhoidal cushions. DESCRIPTION OF PROCEDURE:  This is a 78 y.o. male with a history of anal pain and rectal bleeding that has been recurrent. The complained of persistent pain, intermittent bleeding and failure to respond to maximal medical management. We tried over 6 weeks of fiber, topical steroids and stool softeners with no benefit. After being explained risks, benefits, and alternatives to procedure including but not limited to bleeding, repeat procedures and incontinence, they agreed to proceed. They were taken to the operating room, placed supine, administered general anesthesia, turned to prone jackknife position, and prepped and draped in a normal sterile fashion. Time-out was performed to confirm surgery site and the patient's name. We initially performed a digital rectal exam identifying large hemorrhoidal cushions both in the right anterior, right posterior and left lateral positions. Three separate areas of hemorrhoidal cushions in the sites were identified. Then we performed a bilateral pudendal block using 0.25% Marcaine with epinephrine. They were grasped with right-angle clamp and harmonic scalpel was used to perform hemorrhoidectomy. Hemostasis was achieved and we then inspected further with electrocautery. Once we were satisfied that the hemostasis was adequate, we inserted a dibucaine ladled gel plug into the anus in order for tamponade effect of hemostasis and local anesthetic application. Sterile dressing was applied.   The patient was then awoken and turned to the supine position, and transferred to the postoperative care unit in stable condition. All instrument counts, lap counts, and needle counts were correct at the completion of the procedure.      Maryann Solis MD, MS  Minimally Invasive and Bariatric Surgery  [phone] (p)  7/11/2022  12:32 PM

## 2022-07-11 NOTE — ANESTHESIA PRE PROCEDURE
Diagnosis Code    DDD (degenerative disc disease), lumbar M51.36    Lumbar stenosis with neurogenic claudication M48.062    Radiculopathy, lumbosacral region M54.17    Prostate cancer ([facility] C61    Meniere's disease H81.09    Pure hypercholesterolemia E78.00    Mild intermittent asthma without complication M48.25    Dizziness R42    Viral URI J06.9    Chest heaviness R07.89    Fluttering heart I49.8    Asymptomatic carotid artery stenosis, bilateral I65.23    Chronic renal disease, stage III ([facility] [554035] N18.30       Past Medical History:        Diagnosis Date    Asthma without status asthmaticus 01/15/2010    DDD (degenerative disc disease), lumbar 11/05/2018    History of Holter monitoring 02/01/2022    Intraepithelial squamous cell carcinoma 12/26/2019    Meniere disease     Prostate cancer ([facility] 09/2018    Pure hypercholesterolemia 01/15/2010       Past Surgical History:        Procedure Laterality Date    CARDIOVASCULAR STRESS TEST N/A 02/01/2022    Exercise nuclear stress test    EPIDURAL STEROID INJECTION N/A 05/02/2019    L4-5 INTERLAMINAR EPIDURAL STEROID INJECTION performed by Fauzia Corley DO at [facility] 8 N/A 05/02/2019    l4-5 INTERLAMINAR EPIDURAL STEROID INJECTION    PAIN MANAGEMENT PROCEDURE N/A 6/30/2022    L4-5 INTERLAMINAR EPIDURAL STEROID INJECTION (CPT 29517) performed by Fauzia Corley DO at [address]      childhood       Social History:    Social History     Tobacco Use    Smoking status: Never Smoker    Smokeless tobacco: Never Used   Substance Use Topics    Alcohol use: Not Currently     Comment: 1-3 cans of beer per day.                                 Counseling given: Not Answered      Vital Signs (Current):   Vitals:    07/11/22 1018   BP: 129/67   Pulse: 66   Resp: 16   Temp: 36.1 °C (97 °F)   TempSrc: Infrared   SpO2: 98%   Weight: 163 lb (73.9 kg)   Height: 5' 9\" (1.753 m)                                              BP Readings from Last 3 Encounters:   07/11/22 129/67   06/30/22 (!) 100/52   06/29/22 126/79       NPO Status: Time of last liquid consumption: 1900                        Time of last solid consumption: 1900                        Date of last liquid consumption: 07/10/22                        Date of last solid food consumption: 07/10/22    BMI:   Wt Readings from Last 3 Encounters:   07/11/22 163 lb (73.9 kg)   06/28/22 169 lb (76.7 kg)   06/27/22 169 lb (76.7 kg)     Body mass index is 24.07 kg/m². CBC:   Lab Results   Component Value Date/Time    WBC 8.4 06/20/2022 12:00 PM    RBC 4.94 06/20/2022 12:00 PM    HGB 16.5 06/20/2022 12:00 PM    HCT 50.0 06/20/2022 12:00 PM    .2 06/20/2022 12:00 PM    RDW 13.0 06/20/2022 12:00 PM     06/20/2022 12:00 PM       CMP:   Lab Results   Component Value Date/Time     06/03/2022 08:20 AM    K 4.6 06/03/2022 08:20 AM     06/03/2022 08:20 AM    CO2 20 06/03/2022 08:20 AM    BUN 18 06/03/2022 08:20 AM    CREATININE 1.3 06/03/2022 08:20 AM    GFRAA >60 06/03/2022 08:20 AM    LABGLOM 55 06/03/2022 08:20 AM    GLUCOSE 110 06/03/2022 08:20 AM    PROT 7.3 06/03/2022 08:20 AM    CALCIUM 9.1 06/03/2022 08:20 AM    BILITOT 0.5 06/03/2022 08:20 AM    ALKPHOS 66 06/03/2022 08:20 AM    AST 34 06/03/2022 08:20 AM    ALT 34 06/03/2022 08:20 AM       POC Tests: No results for input(s): POCGLU, POCNA, POCK, POCCL, POCBUN, POCHEMO, POCHCT in the last 72 hours.     Coags:   Lab Results   Component Value Date/Time    PROTIME 11.8 01/18/2022 12:15 PM    INR 1.0 01/18/2022 12:15 PM    APTT 30.8 01/18/2022 12:15 PM       HCG (If Applicable): No results found for: PREGTESTUR, PREGSERUM, HCG, HCGQUANT     ABGs: No results found for: PHART, PO2ART, WCE8WIH, UBQ0DZB, BEART, J8SEOQEX     Type & Screen (If Applicable):  No results found for: LABABO, LABRH    Drug/Infectious Status (If Applicable):  No results found for: HIV, HEPCAB    COVID-19 Screening (If Applicable): No results found for: COVID19        Anesthesia Evaluation  Patient summary reviewed and Nursing notes reviewed no history of anesthetic complications:   Airway: Mallampati: II  TM distance: >3 FB   Neck ROM: full  Mouth opening: > = 3 FB   Dental:          Pulmonary: breath sounds clear to auscultation  (+) asthma:     (-) not a current smoker                           Cardiovascular:  Exercise tolerance: good (>4 METS),   (+) CAD:,       ECG reviewed  Rhythm: regular  Rate: normal    Stress test reviewed       Beta Blocker:  Not on Beta Blocker      ROS comment: Asymptomatic carotid artery stenosis, bilateral  Fluttering heart  Chest heaviness     Neuro/Psych:                ROS comment: Meniere's disease  Dizziness GI/Hepatic/Renal:   (+) renal disease: CRI,          ROS comment: Chronic renal disease, stage III ([facility] (121942). Endo/Other:    (+) malignancy/cancer. ROS comment: Viral URI  DDD (degenerative disc disease), lumbar  Lumbar stenosis with neurogenic claudication  Radiculopathy, lumbosacral region     Abdominal:             Vascular: Other Findings:      CAROTID ULTRASOUND 1-    Impression   The right internal carotid artery demonstrates 50-69% stenosis.       The left internal carotid artery demonstrates 50-69% stenosis.       Bilateral vertebral arteries are patent with flow in the normal direction. CARDIAC STRESS TEST 2-1-2022      Impression       The myocardial perfusion imaging was normal.       Overall left ventricular systolic function was normal with no regional   wall motion abnormalities.       Exercise capacity was average.       Low risk exercise myocardial perfusion imaging study.               Anesthesia Plan      general     ASA 3           MIPS: Postoperative opioids intended and Prophylactic antiemetics administered. Anesthetic plan and risks discussed with patient.     Use of blood products discussed with patient whom consented to blood products. Plan discussed with CRNA.                     Marvel Moon 7/11/2022 SRNA

## 2022-07-22 ENCOUNTER — TELEPHONE (OUTPATIENT)
Dept: INFUSION THERAPY | Age: 67
End: 2022-07-22

## 2022-07-22 NOTE — TELEPHONE ENCOUNTER
Patient calls and states that [facility] has billed the wrong insurance. Provided phone number to [facility] and advised they would have to give updated insurance information to them as I do not have access to their system. Patient verbalizes understanding and is appreciative.

## 2022-07-27 ENCOUNTER — OFFICE VISIT (OUTPATIENT)
Dept: SURGERY | Age: 67
End: 2022-07-27

## 2022-07-27 VITALS
SYSTOLIC BLOOD PRESSURE: 117 MMHG | HEIGHT: 69 IN | TEMPERATURE: 97.5 F | WEIGHT: 163 LBS | HEART RATE: 69 BPM | BODY MASS INDEX: 24.14 KG/M2 | DIASTOLIC BLOOD PRESSURE: 74 MMHG

## 2022-07-27 DIAGNOSIS — K64.2 GRADE III HEMORRHOIDS: Primary | ICD-10-CM

## 2022-07-27 PROCEDURE — 99024 POSTOP FOLLOW-UP VISIT: CPT | Performed by: SURGERY

## 2022-07-27 NOTE — PROGRESS NOTES
General Surgery Office Note  Cornelious Habermann, MD, MS    Patient's Name/Date of Birth: Mickey Burris / 1955    Date: July 27, 2022     Surgeon: Henna Kemp MD    Chief Complaint: s/p hemorrhoidectomy    Patient Active Problem List   Diagnosis    DDD (degenerative disc disease), lumbar    Lumbar stenosis with neurogenic claudication    Radiculopathy, lumbosacral region    Prostate cancer [facility])    Meniere's disease    Pure hypercholesterolemia    Mild intermittent asthma without complication    Dizziness    Viral URI    Chest heaviness    Fluttering heart    Asymptomatic carotid artery stenosis, bilateral    Chronic renal disease, stage III ([facility] [[id number]]       Subjective: doing well, some pain with BM, no bleeding, moving bowels. No fevers. He is using stool softeners, lidocaine jelly, steroid cream and sitz baths    Objective:  /74 (Site: Left Upper Arm, Position: Sitting, Cuff Size: Medium Adult)   Pulse 69   Temp 97.5 °F (36.4 °C)   Ht 5' 9\" (1.753 m)   Wt 163 lb (73.9 kg)   BMI 24.07 kg/m²   Labs:  No results for input(s): WBC, HGB, HCT in the last 72 hours. Invalid input(s): PLR  Lab Results   Component Value Date    CREATININE 1.3 (H) 06/03/2022    BUN 18 06/03/2022     06/03/2022    K 4.6 06/03/2022     06/03/2022    CO2 20 (L) 06/03/2022     No results for input(s): LIPASE, AMYLASE in the last 72 hours.   CBC with Differential:    Lab Results   Component Value Date/Time    WBC 8.4 06/20/2022 12:00 PM    RBC 4.94 06/20/2022 12:00 PM    HGB 16.5 06/20/2022 12:00 PM    HCT 50.0 06/20/2022 12:00 PM     06/20/2022 12:00 PM    .2 06/20/2022 12:00 PM    MCH 33.4 06/20/2022 12:00 PM    MCHC 33.0 06/20/2022 12:00 PM    RDW 13.0 06/20/2022 12:00 PM    LYMPHOPCT 17.7 06/20/2022 12:00 PM    MONOPCT 10.3 06/20/2022 12:00 PM    BASOPCT 0.7 06/20/2022 12:00 PM    MONOSABS 0.87 06/20/2022 12:00 PM    LYMPHSABS 1.49 06/20/2022 12:00 PM    EOSABS 0.11 06/20/2022 12:00 PM    BASOSABS 0.06 06/20/2022 12:00 PM       General appearance: AA, NAD  HEENT: NCAT, PERRLA, EOMI  Lungs: Clear, equal rise bilateral  Heart: Reg  Abdomen: soft, nondistended, nontender  Skin: No lesions, incisions well healed  Psych: No distress, conversive, no hallucinations  : No ulcers or lesions  Rectal: No bleeding, healing well    A complete 10 system review was performed and are otherwise negative unless mentioned in the above HPI. Specific negatives are listed below but may not include all those reviewed. General ROS: negative obtundation, AMS  ENT ROS: negative rhinorrhea, epistaxis  Allergy and Immunology ROS: negative itchy/watery eyes or nasal congestion  Hematological and Lymphatic ROS: negative spontaneous bleeding or bruising  Endocrine ROS: negative  lethargy, mood swings, palpitations or polydipsia/polyuria  Respiratory ROS: negative sputum changes, stridor, tachypnea or wheezing  Cardiovascular ROS: negative for - loss of consciousness, murmur or orthopnea  Gastrointestinal ROS: negative for - hematochezia or hematemesis  Genito-Urinary ROS: negative for -  genital discharge or hematuria  Musculoskeletal ROS: negative for - focal weakness, gangrene  Psych/Neuro ROS: negative for - visual or auditory hallucinations, suicidal ideation      Time spent reviewing past medical, surgical, social and family history, vitals, nursing assessment and images.         Pathology: hemorrhoids    Assessment/Plan:  Lexx Sanchez is a 78 y.o. male 2 weeks after hemorrhoidectomy, doing well    Cont SITZ baths, lidocaine and steroid creams  Motrin prn for pain  Follow up as needed  Pathology reviewed and copy provided  High fiber diet- 30g daily   Stool softeners      Physician Signature: Electronically signed by Dr. Devorah Martinez  [phone] (p)  7/27/2022  9:12 AM

## 2022-08-01 ENCOUNTER — OFFICE VISIT (OUTPATIENT)
Dept: PHYSICAL MEDICINE AND REHAB | Age: 67
End: 2022-08-01
Payer: MEDICARE

## 2022-08-01 VITALS
SYSTOLIC BLOOD PRESSURE: 119 MMHG | HEART RATE: 89 BPM | DIASTOLIC BLOOD PRESSURE: 81 MMHG | BODY MASS INDEX: 24.97 KG/M2 | HEIGHT: 69 IN | WEIGHT: 168.6 LBS

## 2022-08-01 DIAGNOSIS — M48.062 LUMBAR STENOSIS WITH NEUROGENIC CLAUDICATION: Primary | ICD-10-CM

## 2022-08-01 DIAGNOSIS — M53.2X6 LUMBAR SPINE INSTABILITY: ICD-10-CM

## 2022-08-01 DIAGNOSIS — G89.29 CHRONIC BILATERAL LOW BACK PAIN WITHOUT SCIATICA: ICD-10-CM

## 2022-08-01 DIAGNOSIS — M43.10 RETROLISTHESIS: ICD-10-CM

## 2022-08-01 DIAGNOSIS — M54.50 CHRONIC BILATERAL LOW BACK PAIN WITHOUT SCIATICA: ICD-10-CM

## 2022-08-01 PROCEDURE — G8420 CALC BMI NORM PARAMETERS: HCPCS | Performed by: PHYSICAL MEDICINE & REHABILITATION

## 2022-08-01 PROCEDURE — G8427 DOCREV CUR MEDS BY ELIG CLIN: HCPCS | Performed by: PHYSICAL MEDICINE & REHABILITATION

## 2022-08-01 PROCEDURE — 99213 OFFICE O/P EST LOW 20 MIN: CPT | Performed by: PHYSICAL MEDICINE & REHABILITATION

## 2022-08-01 PROCEDURE — 1036F TOBACCO NON-USER: CPT | Performed by: PHYSICAL MEDICINE & REHABILITATION

## 2022-08-01 PROCEDURE — 1123F ACP DISCUSS/DSCN MKR DOCD: CPT | Performed by: PHYSICAL MEDICINE & REHABILITATION

## 2022-08-01 PROCEDURE — 3017F COLORECTAL CA SCREEN DOC REV: CPT | Performed by: PHYSICAL MEDICINE & REHABILITATION

## 2022-08-01 NOTE — PROGRESS NOTES
Donna Benoit, [address] [facility] Physical Medicine and Rehabilitation  [address] [facility] [place]  Phone: [phone]  Fax: [phone]    PCP: Lourdes Renner DO  Date of visit: 8/1/22    Chief Complaint   Patient presents with    Back Pain     F/U after maría     Interval:   Patient presents today for follow up following L4-5 ILESI. He reports 95% relief for the first 2 weeks and then he underwent hemorrhoidectomy. He now reports 75-80% relief in his pain still. He is doing much better overall. He rode his motorcycle today which he wasn't able to do prior to the epidural. The leg cramping is much better. His hip pain is better but he does feel it coming back somewhat. Prostate check up went well. There is no numbness or tingling. No weakness. The pain is rated Pain Score:   3. Referred to NSGY for instability on x-ray. Takes aleve and voltaren gel PRN. Tried lyrica-   Doing HEP daily. Allergies   Allergen Reactions    Penicillins        Current Outpatient Medications   Medication Sig Dispense Refill    lidocaine (XYLOCAINE) 5 % ointment Apply topically as needed to surgical area. 30 g 0    hydrocortisone (ANUSOL-HC) 2.5 % CREA rectal cream Apply to anus twice daily for 2 weeks 28 g 1    diclofenac sodium (VOLTAREN) 1 % GEL Apply 2 g topically 2 times daily      hydrocortisone 2.5 % cream Apply topically 2 times daily. 28 g 0    nystatin (MYCOSTATIN) 140408 UNIT/GM cream Apply topically 2 times daily.  30 g 0    finasteride (PROSCAR) 5 MG tablet Take 5 mg by mouth daily       tamsulosin (FLOMAX) 0.4 MG capsule Take 0.4 mg by mouth daily       tadalafil (CIALIS) 5 MG tablet TAKE 1 TABLET BY MOUTH AS NEEDED (FOR ERECTILE DYSFUNCTION)      Multiple Vitamins-Minerals (CENTRUM SILVER ADULT 50+ PO) Take by mouth      Vitamins-Lipotropics (LIPO-FLAVONOID PLUS PO) two with each meal      ibuprofen (IBU) 800 MG tablet Take 1 tablet by mouth every 6 hours as needed for Pain 90 or sore throat     Respiratory: Denies SOB or cough     Cardiovascular: Denies CP, palpitations, edema      Gastrointestinal: Denies abdominal pain,  N/V, constipation, or diarrhea    Genitourinary: Denies urinary symptoms    Neurologic: See HPI.     MSK: See HPI. Psychiatric: Denies sleep disturbance, anxiety, depression    Hematologic/Lymphatic/Immunologic: Denies bruising       Physical Exam:   Blood pressure 119/81, pulse 89, height 5' 9\" (1.753 m), weight 168 lb 9.6 oz (76.5 kg). General: well developed and well nourished in no acute distress. Body habitus is normal,. HEENT: No rhinorrhea, sneezing, yawning, or lacrimation. No scleral icterus or conjunctival injection. Resp: symmetrical chest expansion, unlabored breathing, respirations unlabored. CV: Heart rate is regular. Peripheral pulses are palpable  Lymph: No visible regional lymphadenopathy. Skin: No rashes or ecchymosis. Normal turgor. Psych: Mood is calm. Affect is normal.   Ext: No edema noted     MSK:   Back/Hip Exam:   Inspection: Pelvis was symmetric. Lumbar lordotic curvature was decreased. There was no scoliosis. No ecchymoses or erythema. Neurological Exam:  Gait is normal.         Impression:   Jackelyn Dunn is a 78 y.o. male    1. Lumbar stenosis with neurogenic claudication    2. Retrolisthesis    3. Lumbar spine instability    4. Chronic bilateral low back pain without sciatica          Plan:   Did well with epidural.   Will call NSGY to schedule regarding instability. Continue HEP   Discussed repeating epidural in future if needed   Okay to wear back support while riding motorcycle     The patient was educated about the diagnosis, prognosis, indications, risks and benefits of treatment. An opportunity to ask questions was given to the patient and questions were answered. The patient agreed to proceed with the recommended treatment as described above.      Emilio Farrell DO, [facility]   Board Certified Physical Medicine and Rehabilitation

## 2023-03-28 ENCOUNTER — OFFICE VISIT (OUTPATIENT)
Dept: VASCULAR SURGERY | Age: 68
End: 2023-03-28
Payer: MEDICARE

## 2023-03-28 ENCOUNTER — HOSPITAL ENCOUNTER (OUTPATIENT)
Dept: CARDIOLOGY | Age: 68
Discharge: HOME | End: 2023-03-28
Payer: MEDICARE

## 2023-03-28 DIAGNOSIS — I65.23 ASYMPTOMATIC CAROTID ARTERY STENOSIS, BILATERAL: Primary | ICD-10-CM

## 2023-03-28 DIAGNOSIS — I65.23 ASYMPTOMATIC CAROTID ARTERY STENOSIS, BILATERAL: ICD-10-CM

## 2023-03-28 PROCEDURE — 1036F TOBACCO NON-USER: CPT | Performed by: PHYSICIAN ASSISTANT

## 2023-03-28 PROCEDURE — 1123F ACP DISCUSS/DSCN MKR DOCD: CPT | Performed by: PHYSICIAN ASSISTANT

## 2023-03-28 PROCEDURE — G8484 FLU IMMUNIZE NO ADMIN: HCPCS | Performed by: PHYSICIAN ASSISTANT

## 2023-03-28 PROCEDURE — 93880 EXTRACRANIAL BILAT STUDY: CPT

## 2023-03-28 PROCEDURE — 3017F COLORECTAL CA SCREEN DOC REV: CPT | Performed by: PHYSICIAN ASSISTANT

## 2023-03-28 PROCEDURE — G8427 DOCREV CUR MEDS BY ELIG CLIN: HCPCS | Performed by: PHYSICIAN ASSISTANT

## 2023-03-28 PROCEDURE — 99213 OFFICE O/P EST LOW 20 MIN: CPT | Performed by: PHYSICIAN ASSISTANT

## 2023-03-28 PROCEDURE — G8420 CALC BMI NORM PARAMETERS: HCPCS | Performed by: PHYSICIAN ASSISTANT

## 2023-03-28 NOTE — PROGRESS NOTES
[facility] Heart & Vascular Lab - [facility]    This is a pre read worksheet - prior to official physician interpretation    Lola Tim  1955  Date of study: 3/28/23    Indication for study:  Carotid artery stenosis  Study : Bilateral Carotid Artery Duplex Examination    Duplex examination of the RIGHT carotid artery system identifies atherosclerotic plaque. The peak systolic velocity in internal carotid artery was 183 centimeters / second. The maximum end diastolic velocity was 51 centimeters / second. The ICA/CCA ratio is 1.6. The right vertebral artery has antegrade flow. Duplex examination of the LEFT carotid artery system identifies atherosclerotic plaque. The peak systolic velocity in internal carotid artery was 189 centimeters / second. The maximum end diastolic velocity was 61 centimeters / second. The ICA/CCA ratio is 2.0. The left vertebral artery has antegrade flow.       LAST STUDY  1/25/2022  Rt 50-69  Lt 50-69

## 2023-03-28 NOTE — PROGRESS NOTES
organomegaly  Musculoskeletal: normal range of motion, no joint swelling, deformity or tenderness  Neurologic: no cranial nerve deficit, gait, coordination and speech normal  Extremities: no leg edema bilaterally    PULSE EXAM      Right      Left   Brachial     Radial     Femoral     Popliteal     Dorsalis Pedis     Posterior Tibial 3 3   (3=normal, 2=diminished, 1=barely palpable, 4=widened)    Radiology:  Marichuy Escoto  1955  Date of study: 3/28/23     Indication for study:  Carotid artery stenosis  Study : Bilateral Carotid Artery Duplex Examination     Duplex examination of the RIGHT carotid artery system identifies atherosclerotic plaque. The peak systolic velocity in internal carotid artery was 183 centimeters / second. The maximum end diastolic velocity was 51 centimeters / second. The ICA/CCA ratio is 1.6. The right vertebral artery has antegrade flow. Duplex examination of the LEFT carotid artery system identifies atherosclerotic plaque. The peak systolic velocity in internal carotid artery was 189 centimeters / second. The maximum end diastolic velocity was 61 centimeters / second. The ICA/CCA ratio is 2.0. The left vertebral artery has antegrade flow. LAST STUDY  1/25/2022  Rt 50-69  Lt 50-69    Problem List Items Addressed This Visit          Circulatory    Asymptomatic carotid artery stenosis, bilateral - Primary    Relevant Orders    US CAROTID ARTERY BILATERAL         I reviewed the results of the ultrasound with the patient showing 50-69% stenosis of bilateral carotid arteries. I asked him to begin taking 81 mg asa daily and discuss cholesterol control with his pcp, although he is hesitant to do so as his brothers have had issues with myalgias because of statin. Will defer to primary. I will see him back in one year with repeat US or sooner with any issues. I reviewed the natural history and treatment options of carotid artery disease.   I reviewed the signs and symptoms

## 2023-11-16 PROBLEM — J06.9 VIRAL URI: Status: RESOLVED | Noted: 2021-10-15 | Resolved: 2023-11-16

## 2023-11-16 PROBLEM — N18.30 CHRONIC RENAL DISEASE, STAGE III (HCC): Status: RESOLVED | Noted: 2022-06-07 | Resolved: 2023-11-16

## 2023-11-16 PROBLEM — R42 DIZZINESS: Status: RESOLVED | Noted: 2021-10-15 | Resolved: 2023-11-16

## 2024-03-21 DIAGNOSIS — I65.23 ASYMPTOMATIC CAROTID ARTERY STENOSIS, BILATERAL: Primary | ICD-10-CM

## 2024-04-09 ENCOUNTER — OFFICE VISIT (OUTPATIENT)
Dept: VASCULAR SURGERY | Age: 69
End: 2024-04-09
Payer: MEDICARE

## 2024-04-09 ENCOUNTER — HOSPITAL ENCOUNTER (OUTPATIENT)
Dept: CARDIOLOGY | Age: 69
Discharge: HOME | End: 2024-04-11
Payer: MEDICARE

## 2024-04-09 VITALS — BODY MASS INDEX: 25.1 KG/M2 | WEIGHT: 170 LBS

## 2024-04-09 DIAGNOSIS — I65.23 ASYMPTOMATIC CAROTID ARTERY STENOSIS, BILATERAL: Primary | ICD-10-CM

## 2024-04-09 DIAGNOSIS — I65.23 ASYMPTOMATIC CAROTID ARTERY STENOSIS, BILATERAL: ICD-10-CM

## 2024-04-09 LAB
VAS LEFT CCA DIST EDV: 27.8 CM/S
VAS LEFT CCA DIST PSV: 87.1 CM/S
VAS LEFT CCA PROX EDV: 19.5 CM/S
VAS LEFT CCA PROX PSV: 101.6 CM/S
VAS LEFT ECA EDV: 18.1 CM/S
VAS LEFT ECA PSV: 113 CM/S
VAS LEFT ICA DIST EDV: 32.5 CM/S
VAS LEFT ICA DIST PSV: 126.1 CM/S
VAS LEFT ICA MID EDV: 39 CM/S
VAS LEFT ICA MID PSV: 137 CM/S
VAS LEFT ICA PROX EDV: 49.9 CM/S
VAS LEFT ICA PROX PSV: 141.4 CM/S
VAS LEFT ICA/CCA PSV: 1.4 NO UNITS
VAS LEFT VERTEBRAL EDV: 14 CM/S
VAS LEFT VERTEBRAL PSV: 59.3 CM/S
VAS RIGHT CCA DIST EDV: 30.8 CM/S
VAS RIGHT CCA DIST PSV: 114.8 CM/S
VAS RIGHT CCA PROX EDV: 18.1 CM/S
VAS RIGHT CCA PROX PSV: 107.5 CM/S
VAS RIGHT ECA EDV: 23.7 CM/S
VAS RIGHT ECA PSV: 154.4 CM/S
VAS RIGHT ICA DIST EDV: 34.2 CM/S
VAS RIGHT ICA DIST PSV: 95.4 CM/S
VAS RIGHT ICA MID EDV: 33.2 CM/S
VAS RIGHT ICA MID PSV: 146.6 CM/S
VAS RIGHT ICA PROX EDV: 51.3 CM/S
VAS RIGHT ICA PROX PSV: 182.7 CM/S
VAS RIGHT ICA/CCA PSV: 1.6 NO UNITS
VAS RIGHT VERTEBRAL EDV: 15.9 CM/S
VAS RIGHT VERTEBRAL PSV: 50.8 CM/S

## 2024-04-09 PROCEDURE — 1036F TOBACCO NON-USER: CPT | Performed by: NURSE PRACTITIONER

## 2024-04-09 PROCEDURE — G8419 CALC BMI OUT NRM PARAM NOF/U: HCPCS | Performed by: NURSE PRACTITIONER

## 2024-04-09 PROCEDURE — 99212 OFFICE O/P EST SF 10 MIN: CPT | Performed by: NURSE PRACTITIONER

## 2024-04-09 PROCEDURE — 3017F COLORECTAL CA SCREEN DOC REV: CPT | Performed by: NURSE PRACTITIONER

## 2024-04-09 PROCEDURE — 1123F ACP DISCUSS/DSCN MKR DOCD: CPT | Performed by: NURSE PRACTITIONER

## 2024-04-09 PROCEDURE — 93880 EXTRACRANIAL BILAT STUDY: CPT

## 2024-04-09 PROCEDURE — G8427 DOCREV CUR MEDS BY ELIG CLIN: HCPCS | Performed by: NURSE PRACTITIONER

## 2024-04-09 PROCEDURE — 93880 EXTRACRANIAL BILAT STUDY: CPT | Performed by: SURGERY

## 2024-04-09 NOTE — PROGRESS NOTES
Vascular Surgery Outpatient Follow Up      Chief Complaint   Patient presents with    Circulatory Problem     Asymptomatic carotid artery stenosis bilateral       HISTORY OF PRESENT ILLNESS:                The patient is a 69 y.o. male who is returns for evaluation of carotid artery stenosis. He is accompanied by his wife.  He denies any new unilateral weakness, numbness, slurred speech or amaurosis fugax.     He continues to have issues with dizziness. He has Meniere disease. He is scheduled to see a specialist in [place] regarding this issue later this month.     Past Medical History:        Diagnosis Date    Asthma without status asthmaticus 01/15/2010    Chest heaviness     DDD (degenerative disc disease), lumbar 11/05/2018    Dizziness     Fluttering heart     History of Holter monitoring 02/01/2022    Intraepithelial squamous cell carcinoma 12/26/2019    Meniere disease     Prostate cancer (HCC) 09/2018    Pure hypercholesterolemia 01/15/2010     Past Surgical History:        Procedure Laterality Date    CARDIOVASCULAR STRESS TEST N/A 02/01/2022    Exercise nuclear stress test    EPIDURAL STEROID INJECTION N/A 05/02/2019    L4-5 INTERLAMINAR EPIDURAL STEROID INJECTION performed by Daniella Dowd DO at [facility] OR    HEMORRHOID SURGERY N/A 7/11/2022    EXAM UNDER ANESTHESIA, HEMORRHOIDECTOMY (CPT 68402) performed by Odilon Hinojosa MD at [facility] OR    OTHER SURGICAL HISTORY N/A 05/02/2019    l4-5 INTERLAMINAR EPIDURAL STEROID INJECTION    PAIN MANAGEMENT PROCEDURE N/A 6/30/2022    L4-5 INTERLAMINAR EPIDURAL STEROID INJECTION (CPT 51682) performed by Daniella Dowd DO at [facility] OR    PROSTATE SURGERY      TONSILLECTOMY      childhood     Current Medications:   Prior to Admission medications    Medication Sig Start Date End Date Taking? Authorizing Provider   meclizine (ANTIVERT) 25 MG tablet Take 1 tablet by mouth 3 times daily as needed for Dizziness   Yes Provider, MD Talita

## 2025-04-04 DIAGNOSIS — I65.23 ASYMPTOMATIC CAROTID ARTERY STENOSIS, BILATERAL: Primary | ICD-10-CM

## 2025-06-10 ENCOUNTER — OFFICE VISIT (OUTPATIENT)
Dept: VASCULAR SURGERY | Age: 70
End: 2025-06-10
Payer: MEDICARE

## 2025-06-10 ENCOUNTER — HOSPITAL ENCOUNTER (OUTPATIENT)
Dept: CARDIOLOGY | Age: 70
Discharge: HOME | End: 2025-06-12
Payer: MEDICARE

## 2025-06-10 VITALS — BODY MASS INDEX: 26.58 KG/M2 | WEIGHT: 180 LBS

## 2025-06-10 DIAGNOSIS — I65.23 ASYMPTOMATIC CAROTID ARTERY STENOSIS, BILATERAL: ICD-10-CM

## 2025-06-10 DIAGNOSIS — I65.23 ASYMPTOMATIC CAROTID ARTERY STENOSIS, BILATERAL: Primary | ICD-10-CM

## 2025-06-10 LAB
VAS LEFT CCA DIST EDV: 21.9 CM/S
VAS LEFT CCA DIST PSV: 83.2 CM/S
VAS LEFT CCA PROX EDV: 27.2 CM/S
VAS LEFT CCA PROX PSV: 116.6 CM/S
VAS LEFT ECA EDV: 16.2 CM/S
VAS LEFT ECA PSV: 98.4 CM/S
VAS LEFT ICA DIST EDV: 25.4 CM/S
VAS LEFT ICA DIST PSV: 109.3 CM/S
VAS LEFT ICA MID EDV: 27.2 CM/S
VAS LEFT ICA MID PSV: 96.6 CM/S
VAS LEFT ICA PROX EDV: 45.5 CM/S
VAS LEFT ICA PROX PSV: 154.4 CM/S
VAS LEFT ICA/CCA PSV: 1.3 NO UNITS
VAS LEFT VERTEBRAL EDV: 12.2 CM/S
VAS LEFT VERTEBRAL PSV: 61.1 CM/S
VAS RIGHT CCA DIST EDV: 21.9 CM/S
VAS RIGHT CCA DIST PSV: 101.6 CM/S
VAS RIGHT CCA PROX EDV: 14.4 CM/S
VAS RIGHT CCA PROX PSV: 98.4 CM/S
VAS RIGHT ECA EDV: 18.1 CM/S
VAS RIGHT ECA PSV: 114.8 CM/S
VAS RIGHT ICA DIST EDV: 25.6 CM/S
VAS RIGHT ICA DIST PSV: 88.1 CM/S
VAS RIGHT ICA MID EDV: 23.7 CM/S
VAS RIGHT ICA MID PSV: 121.8 CM/S
VAS RIGHT ICA PROX EDV: 34.6 CM/S
VAS RIGHT ICA PROX PSV: 145.7 CM/S
VAS RIGHT ICA/CCA PSV: 1.4 NO UNITS
VAS RIGHT VERTEBRAL EDV: 14.1 CM/S
VAS RIGHT VERTEBRAL PSV: 47.5 CM/S

## 2025-06-10 PROCEDURE — 1036F TOBACCO NON-USER: CPT | Performed by: SURGERY

## 2025-06-10 PROCEDURE — 93880 EXTRACRANIAL BILAT STUDY: CPT | Performed by: SURGERY

## 2025-06-10 PROCEDURE — G8419 CALC BMI OUT NRM PARAM NOF/U: HCPCS | Performed by: SURGERY

## 2025-06-10 PROCEDURE — G8427 DOCREV CUR MEDS BY ELIG CLIN: HCPCS | Performed by: SURGERY

## 2025-06-10 PROCEDURE — 3017F COLORECTAL CA SCREEN DOC REV: CPT | Performed by: SURGERY

## 2025-06-10 PROCEDURE — 99213 OFFICE O/P EST LOW 20 MIN: CPT | Performed by: SURGERY

## 2025-06-10 PROCEDURE — 1123F ACP DISCUSS/DSCN MKR DOCD: CPT | Performed by: SURGERY

## 2025-06-10 PROCEDURE — 1159F MED LIST DOCD IN RCRD: CPT | Performed by: SURGERY

## 2025-06-10 PROCEDURE — 93880 EXTRACRANIAL BILAT STUDY: CPT

## 2025-06-10 NOTE — PROGRESS NOTES
Rash:    No [x]/Yes []      Ulcers:   No [x]/Yes []              Abnorm pigment: No [x]/Yes []  :              Frequency/urgency:  No [x]/Yes []      Hematuria:    No [x]/Yes []                      Incontinence:    No [x]/Yes []    PHYSICAL EXAM:  There were no vitals filed for this visit.  General Appearance: alert and oriented to person, place and time, in no acute distress, well developed and well- nourished  Neurologic: no cranial nerve deficit, speech normal  Head: normocephalic and atraumatic  Eyes: extraocular eye movements intact, conjunctivae normal  ENT: external ear and ear canal normal bilaterally, nose without deformity, no carotid bruits  Pulmonary/Chest: normal air movement, no respiratory distress  Cardiovascular: normal rate, regular rhythm, no murmur  Abdomen: non-distended, no masses  Musculoskeletal: no joint deformity or tenderness  Extremities: no leg edema bilaterally  Skin: warm and dry, no rash or erythema    PULSE EXAM      Right      Left   Brachial     Radial 3 3   Femoral     Popliteal     Dorsalis Pedis     Posterior Tibial 2 2   (3=normal, 2=diminished, 1=barely palpable, 4=widened)    RADIOLOGY: [facility] today    Problem List Items Addressed This Visit       Asymptomatic carotid artery stenosis, bilateral - Primary    Relevant Orders    Vascular duplex carotid bilateral       We repeated an ultrasound of the carotid arteries that shows persistent 50% stenoses in the internal carotid arteries bilaterally.  This is unchanged from the previous study.    I reviewed with the patient that from my standpoint he can continue with all normal activities.  I will plan to see him again in two years but asked him to call sooner with any new problems.        Return in about 2 years (around 6/10/2027) for testing.

## (undated) DEVICE — SPONGE,LAP,12"X12",XR,ST,5/PK,40PK/CS: Brand: MEDLINE

## (undated) DEVICE — GARMENT,MEDLINE,DVT,INT,CALF,MED, GEN2: Brand: MEDLINE

## (undated) DEVICE — YANKAUER,BULB TIP,W/O VENT,RIGID,STERILE: Brand: MEDLINE

## (undated) DEVICE — 3M™ RED DOT™ MONITORING ELECTRODE WITH FOAM TAPE AND STICKY GEL 2560, 50/BAG, 20/CASE, 72/PLT: Brand: RED DOT™

## (undated) DEVICE — TOWEL OR BLUEE 16X26IN ST 8 PACK ORB08 16X26ORTWL

## (undated) DEVICE — Z DISCONTINUED APPLICATOR SURG PREP 0.35OZ 2% CHG 70% ISO ALC W/ HI LT

## (undated) DEVICE — ENCORE® LATEX TEXTURED SIZE 6.5, STERILE LATEX POWDER-FREE SURGICAL GLOVE: Brand: ENCORE

## (undated) DEVICE — BANDAGE ADH W1XL3IN NAT FAB WVN FLX DURABLE N ADH PD SEAL

## (undated) DEVICE — GAUZE,SPONGE,4"X4",16PLY,STRL,LF,10/TRAY: Brand: MEDLINE

## (undated) DEVICE — NEEDLE HYPO 25GA L1.5IN BLU POLYPR HUB S STL REG BVL STR

## (undated) DEVICE — ELECTRODE PT RET AD L9FT HI MOIST COND ADH HYDRGEL CORDED

## (undated) DEVICE — SPONGE GZ W4XL4IN RAYON POLY FILL CVR W/ NONWOVEN FAB

## (undated) DEVICE — PENCIL ES L3M BTTN SWCH HOLSTER W/ BLDE ELECTRD EDGE

## (undated) DEVICE — GOWN,SIRUS,NONRNF,SETINSLV,XL,20/CS: Brand: MEDLINE

## (undated) DEVICE — NEEDLE HYPO 18GA L1.5IN PNK POLYPR HUB S STL THN WALL FILL

## (undated) DEVICE — SYRINGE MED 10ML TRNSLUC BRL PLUNG BLK MRK POLYPR CTRL

## (undated) DEVICE — MARKER,SKIN,WI/RULER AND LABELS: Brand: MEDLINE

## (undated) DEVICE — DOUBLE BASIN SET: Brand: MEDLINE INDUSTRIES, INC.

## (undated) DEVICE — NDL CNTR 40CT FM MAG: Brand: MEDLINE INDUSTRIES, INC.

## (undated) DEVICE — INTENDED FOR TISSUE SEPARATION, AND OTHER PROCEDURES THAT REQUIRE A SHARP SURGICAL BLADE TO PUNCTURE OR CUT.: Brand: BARD-PARKER ® STAINLESS STEEL BLADES

## (undated) DEVICE — NON-DEHP CATHETER EXTENSION SET, MALE LUER LOCK ADAPTER

## (undated) DEVICE — SOLUTION SCRB 32OZ 7.5% POVIDONE IOD BTL GENTLE EFFECTIVE

## (undated) DEVICE — COVER,LIGHT HANDLE,FLX,1/PK: Brand: MEDLINE INDUSTRIES, INC.

## (undated) DEVICE — 6 ML SYRINGE LUER-LOCK TIP: Brand: MONOJECT

## (undated) DEVICE — PAD,ABDOMINAL,5"X9",ST,LF,25/BX: Brand: MEDLINE INDUSTRIES, INC.

## (undated) DEVICE — SYRINGE MED 10ML LUERLOCK TIP W/O SFTY DISP

## (undated) DEVICE — TOWEL,OR,DSP,ST,BLUE,STD,6/PK,12PK/CS: Brand: MEDLINE

## (undated) DEVICE — GAUZE,SPONGE,4"X4",12PLY,STERILE,LF,2'S: Brand: MEDLINE

## (undated) DEVICE — GLOVE ORANGE PI 7 1/2   MSG9075

## (undated) DEVICE — TRAY PROCED CUSTOM RECTAL

## (undated) DEVICE — PACK,LAPAROTOMY,NO GOWNS: Brand: MEDLINE

## (undated) DEVICE — SEALER LAP SM L18.8CM OPN JAW HAND/FOOT SWCH FORCETRIAD

## (undated) DEVICE — SOLUTION IRRIG 1500ML 0.9% SOD CHL USP POUR PLAS BTL

## (undated) DEVICE — 3 ML SYRINGE LUER-LOCK TIP: Brand: MONOJECT

## (undated) DEVICE — SPONGE ABSORBABLE HEMORRHOIDECTOMY 8X3 CM GEL SURGFOAM

## (undated) DEVICE — BLADE ES ELASTOMERIC COAT INSUL DURABLE BEND UPTO 90DEG

## (undated) DEVICE — TUBING, SUCTION, 1/4" X 10', STRAIGHT: Brand: MEDLINE

## (undated) DEVICE — READY WET SKIN SCRUB TRAY-LF: Brand: MEDLINE INDUSTRIES, INC.